# Patient Record
Sex: MALE | Race: WHITE | Employment: OTHER | URBAN - METROPOLITAN AREA
[De-identification: names, ages, dates, MRNs, and addresses within clinical notes are randomized per-mention and may not be internally consistent; named-entity substitution may affect disease eponyms.]

---

## 2017-03-01 ENCOUNTER — APPOINTMENT (OUTPATIENT)
Dept: LAB | Age: 62
End: 2017-03-01
Attending: DERMATOLOGY
Payer: COMMERCIAL

## 2017-03-01 DIAGNOSIS — C44.320 SCC (SQUAMOUS CELL CARCINOMA), FACE: ICD-10-CM

## 2017-03-01 PROCEDURE — 88305 TISSUE EXAM BY PATHOLOGIST: CPT

## 2020-01-20 PROBLEM — Z12.11 SPECIAL SCREENING FOR MALIGNANT NEOPLASM OF COLON: Status: ACTIVE | Noted: 2020-01-20

## 2020-01-20 PROBLEM — K63.5 POLYP OF COLON: Status: ACTIVE | Noted: 2020-01-20

## 2020-01-21 PROCEDURE — 88305 TISSUE EXAM BY PATHOLOGIST: CPT | Performed by: INTERNAL MEDICINE

## 2020-07-02 ENCOUNTER — LAB REQUISITION (OUTPATIENT)
Dept: LAB | Facility: HOSPITAL | Age: 65
End: 2020-07-02
Payer: COMMERCIAL

## 2020-07-02 DIAGNOSIS — D49.2 NEOPLASM OF UNSPECIFIED BEHAVIOR OF BONE, SOFT TISSUE, AND SKIN: ICD-10-CM

## 2020-07-02 PROCEDURE — 88305 TISSUE EXAM BY PATHOLOGIST: CPT | Performed by: DERMATOLOGY

## 2020-11-23 ENCOUNTER — MED REC SCAN ONLY (OUTPATIENT)
Dept: NEPHROLOGY | Facility: CLINIC | Age: 65
End: 2020-11-23

## 2020-12-02 ENCOUNTER — OFFICE VISIT (OUTPATIENT)
Dept: NEPHROLOGY | Facility: CLINIC | Age: 65
End: 2020-12-02

## 2020-12-02 VITALS — SYSTOLIC BLOOD PRESSURE: 126 MMHG | DIASTOLIC BLOOD PRESSURE: 78 MMHG | BODY MASS INDEX: 22 KG/M2 | WEIGHT: 176 LBS

## 2020-12-02 DIAGNOSIS — N20.0 STAGHORN CALCULUS: ICD-10-CM

## 2020-12-02 DIAGNOSIS — N18.30 STAGE 3 CHRONIC KIDNEY DISEASE, UNSPECIFIED WHETHER STAGE 3A OR 3B CKD (HCC): Primary | ICD-10-CM

## 2020-12-02 PROCEDURE — 3074F SYST BP LT 130 MM HG: CPT | Performed by: INTERNAL MEDICINE

## 2020-12-02 PROCEDURE — 99244 OFF/OP CNSLTJ NEW/EST MOD 40: CPT | Performed by: INTERNAL MEDICINE

## 2020-12-02 PROCEDURE — 3078F DIAST BP <80 MM HG: CPT | Performed by: INTERNAL MEDICINE

## 2020-12-02 NOTE — PROGRESS NOTES
Nephrology Consult Note    REASON FOR CONSULT: CKD 3    ASSESSMENT/PLAN:        1) CKD 3- serum creatinine has increased gradually from 1.0 to 1.49 mg/dL since 2016 and likely reflects loss of left-sided renal function given history of a large staghorn tavon prescription medications. Does not take NSAIDs.     ROS:    Denies fever/chills  Denies wt loss/gain  Denies HA or visual changes  Denies CP or palpitations  Denies SOB/cough/hemoptysis  Denies abd or flank pain  Denies N/V/D  Denies change in urinary habi Regular rate and rhythm, S1, S2 normal, no murmur or rub  Lungs: Clear without wheezes, rales, rhonchi. Abdomen: Soft, non-tender. + bowel sounds, no palpable organomegaly  Extremities: Without clubbing, cyanosis or edema.   Neurologic:  normal affect, c

## 2020-12-03 ENCOUNTER — HOSPITAL ENCOUNTER (OUTPATIENT)
Dept: ULTRASOUND IMAGING | Facility: HOSPITAL | Age: 65
Discharge: HOME | End: 2020-12-03
Attending: INTERNAL MEDICINE
Payer: COMMERCIAL

## 2020-12-03 ENCOUNTER — LAB ENCOUNTER (OUTPATIENT)
Dept: LAB | Facility: HOSPITAL | Age: 65
End: 2020-12-03
Attending: INTERNAL MEDICINE
Payer: COMMERCIAL

## 2020-12-03 DIAGNOSIS — N20.0 STAGHORN CALCULUS: ICD-10-CM

## 2020-12-03 DIAGNOSIS — N18.30 STAGE 3 CHRONIC KIDNEY DISEASE, UNSPECIFIED WHETHER STAGE 3A OR 3B CKD (HCC): ICD-10-CM

## 2020-12-03 PROCEDURE — 87077 CULTURE AEROBIC IDENTIFY: CPT

## 2020-12-03 PROCEDURE — 81001 URINALYSIS AUTO W/SCOPE: CPT

## 2020-12-03 PROCEDURE — 76770 US EXAM ABDO BACK WALL COMP: CPT | Performed by: INTERNAL MEDICINE

## 2020-12-03 PROCEDURE — 87086 URINE CULTURE/COLONY COUNT: CPT

## 2020-12-08 ENCOUNTER — TELEPHONE (OUTPATIENT)
Dept: NEPHROLOGY | Facility: CLINIC | Age: 65
End: 2020-12-08

## 2020-12-09 NOTE — TELEPHONE ENCOUNTER
D/W pt- CKD 3 Cr 1.5ish due to atrophic / nonfunctioning L kidney; has a large > 1.5 cm calcification of L kidney + hydronephrosis by US- asked pt to f/u with  (Dr. Pascale Tolliver)- labs visit 2014- thx Chelsey Foster

## 2020-12-19 ENCOUNTER — HOSPITAL ENCOUNTER (OUTPATIENT)
Dept: GENERAL RADIOLOGY | Age: 65
End: 2020-12-19
Attending: PHYSICIAN ASSISTANT
Payer: COMMERCIAL

## 2020-12-19 ENCOUNTER — HOSPITAL ENCOUNTER (OUTPATIENT)
Dept: GENERAL RADIOLOGY | Facility: HOSPITAL | Age: 65
Discharge: HOME | End: 2020-12-19
Attending: PHYSICIAN ASSISTANT
Payer: COMMERCIAL

## 2020-12-19 DIAGNOSIS — M54.50 LOW BACK PAIN: ICD-10-CM

## 2020-12-19 DIAGNOSIS — M54.32 SCIATICA OF LEFT SIDE: ICD-10-CM

## 2020-12-19 PROCEDURE — 72110 X-RAY EXAM L-2 SPINE 4/>VWS: CPT | Performed by: PHYSICIAN ASSISTANT

## 2020-12-23 ENCOUNTER — HOSPITAL ENCOUNTER (OUTPATIENT)
Dept: CT IMAGING | Facility: HOSPITAL | Age: 65
Discharge: HOME | End: 2020-12-23
Attending: PHYSICIAN ASSISTANT
Payer: COMMERCIAL

## 2020-12-23 ENCOUNTER — APPOINTMENT (OUTPATIENT)
Dept: GENERAL RADIOLOGY | Age: 65
End: 2020-12-23
Attending: PHYSICIAN ASSISTANT
Payer: COMMERCIAL

## 2020-12-23 ENCOUNTER — HOSPITAL ENCOUNTER (OUTPATIENT)
Dept: GENERAL RADIOLOGY | Facility: HOSPITAL | Age: 65
Discharge: HOME | End: 2020-12-23
Attending: PHYSICIAN ASSISTANT
Payer: COMMERCIAL

## 2020-12-23 DIAGNOSIS — N13.2 HYDRONEPHROSIS WITH OBSTRUCTING CALCULUS: ICD-10-CM

## 2020-12-23 DIAGNOSIS — N20.0 RENAL STONE: ICD-10-CM

## 2020-12-23 PROCEDURE — 74018 RADEX ABDOMEN 1 VIEW: CPT | Performed by: PHYSICIAN ASSISTANT

## 2020-12-23 PROCEDURE — 74176 CT ABD & PELVIS W/O CONTRAST: CPT | Performed by: UROLOGY

## 2021-01-20 NOTE — H&P
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 He presents as a consult from Dr Joe Barkley office with a left staghorn calculus.     Nephro - Estephanie Stout PCNL with antegrade stent placement. We discussed the risks and benefits to the procedure including, but not limited to, bleeding, infection, possible damage to surrounding structures.  We discussed the possible need for second procedure such as URS if we a above       ASSESSMENT/PLAN:   Diagnoses and all orders for this visit:    Staghorn calculus  -     URINALYSIS, AUTO, W/O SCOPE  -     URINE CULTURE, ROUTINE    Hydronephrosis, left  -     URINALYSIS, AUTO, W/O SCOPE  -     URINE CULTURE, ROUTINE    Urine

## 2021-01-20 NOTE — H&P (VIEW-ONLY)
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 He presents as a consult from Dr Renata Esparzar office with a left staghorn calculus.     Niurka - Andre Nguyễn PCNL with antegrade stent placement. We discussed the risks and benefits to the procedure including, but not limited to, bleeding, infection, possible damage to surrounding structures.  We discussed the possible need for second procedure such as URS if we a above       ASSESSMENT/PLAN:   Diagnoses and all orders for this visit:    Staghorn calculus  -     URINALYSIS, AUTO, W/O SCOPE  -     URINE CULTURE, ROUTINE    Hydronephrosis, left  -     URINALYSIS, AUTO, W/O SCOPE  -     URINE CULTURE, ROUTINE    Urine

## 2021-01-26 ENCOUNTER — TELEPHONE (OUTPATIENT)
Dept: SURGERY | Facility: CLINIC | Age: 66
End: 2021-01-26

## 2021-01-26 ENCOUNTER — OFFICE VISIT (OUTPATIENT)
Dept: SURGERY | Facility: CLINIC | Age: 66
End: 2021-01-26

## 2021-01-26 VITALS — HEART RATE: 76 BPM | DIASTOLIC BLOOD PRESSURE: 77 MMHG | TEMPERATURE: 97 F | SYSTOLIC BLOOD PRESSURE: 145 MMHG

## 2021-01-26 DIAGNOSIS — N20.0 STAGHORN CALCULUS: Primary | ICD-10-CM

## 2021-01-26 DIAGNOSIS — N13.30 HYDRONEPHROSIS, LEFT: ICD-10-CM

## 2021-01-26 DIAGNOSIS — R82.90 URINE FINDING: ICD-10-CM

## 2021-01-26 LAB
MULTISTIX LOT#: 5077 NUMERIC
NITRITE, URINE: POSITIVE
PH, URINE: 6.5 (ref 4.5–8)
SPECIFIC GRAVITY: 1.02 (ref 1–1.03)
URINE-COLOR: YELLOW
UROBILINOGEN,SEMI-QN: 0.2 MG/DL (ref 0–1.9)

## 2021-01-26 PROCEDURE — 99204 OFFICE O/P NEW MOD 45 MIN: CPT | Performed by: UROLOGY

## 2021-01-26 PROCEDURE — 3077F SYST BP >= 140 MM HG: CPT | Performed by: UROLOGY

## 2021-01-26 PROCEDURE — 3078F DIAST BP <80 MM HG: CPT | Performed by: UROLOGY

## 2021-01-26 PROCEDURE — 81003 URINALYSIS AUTO W/O SCOPE: CPT | Performed by: UROLOGY

## 2021-01-26 NOTE — TELEPHONE ENCOUNTER
Good Afternoon    Please provide referring provider for Jenita Going see referral [id number]        Thanks    [address] [facility]

## 2021-01-26 NOTE — TELEPHONE ENCOUNTER
Called patient this date to schedule surgery. Patient did not answer and voicemail box is full and could not leave a message. Will try again later.

## 2021-01-26 NOTE — TELEPHONE ENCOUNTER
Felipe Castillo,  Could you please schedule this patient for surgery? Needs IR nephroureteral access first thing in the morning and procedure ~ 11 am that day. Needs medical clearance prior. Overnight stay. Thank you!     [facility]    Urology Surgery Request  Surg

## 2021-01-26 NOTE — TELEPHONE ENCOUNTER
Patient called back this date to schedule procedure. Patient was scheduled for 2/04/21 at [address] [place] [address] for IR to place nephroureteral access prior to procedure was placed. Reviewed pre-op. Instructions with patient who voiced understanding.   H

## 2021-01-27 ENCOUNTER — TELEPHONE (OUTPATIENT)
Dept: SURGERY | Facility: CLINIC | Age: 66
End: 2021-01-27

## 2021-01-27 RX ORDER — ACETAMINOPHEN 500 MG
1000 TABLET ORAL ONCE
Status: CANCELLED | OUTPATIENT
Start: 2021-01-27 | End: 2021-01-27

## 2021-01-27 RX ORDER — CEFAZOLIN SODIUM/WATER 2 G/20 ML
2 SYRINGE (ML) INTRAVENOUS ONCE
Status: CANCELLED | OUTPATIENT
Start: 2021-01-27 | End: 2021-01-27

## 2021-01-27 RX ORDER — ERGOCALCIFEROL (VITAMIN D2) 10 MCG
1 TABLET ORAL DAILY
COMMUNITY

## 2021-01-27 NOTE — PAT NURSING NOTE
Raleigh Tafoya back and said he clarified and informed the surgeons office of need for them to schedule the placement of the neph tube in our radiology dept.  He said he needed to give message to the office person who would need to pass the message on to t

## 2021-01-27 NOTE — PAT NURSING NOTE
I called the office to ask several questions, but was only able to have a message sent to Rashel. Await her call back at this time.

## 2021-01-27 NOTE — PAT NURSING NOTE
II called surgery scheduling and talked to Shirley Dutton him that there is no appt in radiology for Neph tube placement pre-op. He said he would call the surgeon office, and that they are the one's who need to make the appt with Radiology.

## 2021-01-27 NOTE — PAT NURSING NOTE
I spoke to patient and he said he has reached out to His PCP office Dr Komal Mercer, in order to make an appt pre-op with him. He said the pcp is waiting for the surgeon office to \"resend \" paperwork which needed some corrections on it. And once PCP receives th

## 2021-01-27 NOTE — PAT NURSING NOTE
Arvin Lai has not retirned my call, as questions regarding her \"scheduling\" the neph tube procedure in IR. It currently has not been scheduled.

## 2021-01-27 NOTE — PAT NURSING NOTE
I left Sharmin Simon a detailed message, about Neph tube scheduling, and also questions about pt's not taking or ?taking gatorade and tylenol pre procedure. Versus NPO after 11pm as ordered on the scheduling request sheet.  Need clarification of these above

## 2021-01-27 NOTE — TELEPHONE ENCOUNTER
Per Ramy Quintana with [facility] needs to speak to [facility] regarding upcoming surgery. Per Ramy Quintana it is important.  Please advise

## 2021-01-27 NOTE — PAT NURSING NOTE
Pt wife called and said pt will see Dr Kennard Cushing and have pre testing done on 2-1-21. I fax'd to Dr Kennard Cushing the necessary pre testing  . And called the office and left them a message that this fax was sent.

## 2021-01-28 NOTE — TELEPHONE ENCOUNTER
Called Pierre at [facility] this date. Left message for her to call me back.

## 2021-01-28 NOTE — TELEPHONE ENCOUNTER
Called Geeta at IR this date and let her know order placed for tube placement prior to surgery on 2/4/21 at 11:45 a.m.

## 2021-02-02 ENCOUNTER — LAB ENCOUNTER (OUTPATIENT)
Dept: LAB | Facility: HOSPITAL | Age: 66
End: 2021-02-02
Attending: UROLOGY
Payer: COMMERCIAL

## 2021-02-02 DIAGNOSIS — Z01.818 PRE-OP TESTING: ICD-10-CM

## 2021-02-02 LAB — SARS-COV-2 RNA RESP QL NAA+PROBE: NOT DETECTED

## 2021-02-04 ENCOUNTER — ANESTHESIA (OUTPATIENT)
Dept: SURGERY | Facility: HOSPITAL | Age: 66
End: 2021-02-04
Payer: COMMERCIAL

## 2021-02-04 ENCOUNTER — APPOINTMENT (OUTPATIENT)
Dept: GENERAL RADIOLOGY | Facility: HOSPITAL | Age: 66
End: 2021-02-04
Attending: UROLOGY
Payer: COMMERCIAL

## 2021-02-04 ENCOUNTER — HOSPITAL ENCOUNTER (OUTPATIENT)
Facility: HOSPITAL | Age: 66
Discharge: HOME | End: 2021-02-05
Attending: UROLOGY | Admitting: UROLOGY
Payer: COMMERCIAL

## 2021-02-04 ENCOUNTER — ANESTHESIA EVENT (OUTPATIENT)
Dept: SURGERY | Facility: HOSPITAL | Age: 66
End: 2021-02-04
Payer: COMMERCIAL

## 2021-02-04 ENCOUNTER — HOSPITAL ENCOUNTER (OUTPATIENT)
Dept: INTERVENTIONAL RADIOLOGY/VASCULAR | Facility: HOSPITAL | Age: 66
Discharge: HOME | End: 2021-02-04
Attending: UROLOGY | Admitting: UROLOGY
Payer: COMMERCIAL

## 2021-02-04 DIAGNOSIS — N20.0 STAGHORN KIDNEY STONES: ICD-10-CM

## 2021-02-04 DIAGNOSIS — N20.0 STAGHORN CALCULUS: ICD-10-CM

## 2021-02-04 DIAGNOSIS — Z01.818 PRE-OP TESTING: Primary | ICD-10-CM

## 2021-02-04 PROCEDURE — 0TC14ZZ EXTIRPATION OF MATTER FROM LEFT KIDNEY, PERCUTANEOUS ENDOSCOPIC APPROACH: ICD-10-PCS | Performed by: UROLOGY

## 2021-02-04 PROCEDURE — 76000 FLUOROSCOPY <1 HR PHYS/QHP: CPT | Performed by: UROLOGY

## 2021-02-04 PROCEDURE — 82365 CALCULUS SPECTROSCOPY: CPT | Performed by: UROLOGY

## 2021-02-04 RX ORDER — ONDANSETRON 2 MG/ML
4 INJECTION INTRAMUSCULAR; INTRAVENOUS EVERY 6 HOURS PRN
Status: DISCONTINUED | OUTPATIENT
Start: 2021-02-04 | End: 2021-02-05

## 2021-02-04 RX ORDER — MORPHINE SULFATE 4 MG/ML
4 INJECTION, SOLUTION INTRAMUSCULAR; INTRAVENOUS EVERY 2 HOUR PRN
Status: ACTIVE | OUTPATIENT
Start: 2021-02-04 | End: 2021-02-05

## 2021-02-04 RX ORDER — MORPHINE SULFATE 2 MG/ML
2 INJECTION, SOLUTION INTRAMUSCULAR; INTRAVENOUS EVERY 2 HOUR PRN
Status: ACTIVE | OUTPATIENT
Start: 2021-02-04 | End: 2021-02-05

## 2021-02-04 RX ORDER — FAMOTIDINE 20 MG/1
20 TABLET ORAL 2 TIMES DAILY PRN
Status: DISCONTINUED | OUTPATIENT
Start: 2021-02-04 | End: 2021-02-05

## 2021-02-04 RX ORDER — CEFAZOLIN SODIUM/WATER 2 G/20 ML
2 SYRINGE (ML) INTRAVENOUS EVERY 8 HOURS
Status: COMPLETED | OUTPATIENT
Start: 2021-02-04 | End: 2021-02-05

## 2021-02-04 RX ORDER — SODIUM CHLORIDE, SODIUM LACTATE, POTASSIUM CHLORIDE, CALCIUM CHLORIDE 600; 310; 30; 20 MG/100ML; MG/100ML; MG/100ML; MG/100ML
INJECTION, SOLUTION INTRAVENOUS CONTINUOUS
Status: DISCONTINUED | OUTPATIENT
Start: 2021-02-04 | End: 2021-02-04 | Stop reason: HOSPADM

## 2021-02-04 RX ORDER — LEVOFLOXACIN 5 MG/ML
500 INJECTION, SOLUTION INTRAVENOUS
Status: COMPLETED | OUTPATIENT
Start: 2021-02-04 | End: 2021-02-04

## 2021-02-04 RX ORDER — MIDAZOLAM HYDROCHLORIDE 1 MG/ML
INJECTION INTRAMUSCULAR; INTRAVENOUS
Status: COMPLETED
Start: 2021-02-04 | End: 2021-02-04

## 2021-02-04 RX ORDER — HYDROCODONE BITARTRATE AND ACETAMINOPHEN 5; 325 MG/1; MG/1
1 TABLET ORAL EVERY 4 HOURS PRN
Status: DISCONTINUED | OUTPATIENT
Start: 2021-02-04 | End: 2021-02-05

## 2021-02-04 RX ORDER — DIPHENHYDRAMINE HYDROCHLORIDE 50 MG/ML
25 INJECTION INTRAMUSCULAR; INTRAVENOUS NIGHTLY PRN
Status: DISCONTINUED | OUTPATIENT
Start: 2021-02-04 | End: 2021-02-05

## 2021-02-04 RX ORDER — SODIUM CHLORIDE 0.9 % (FLUSH) 0.9 %
10 SYRINGE (ML) INJECTION AS NEEDED
Status: DISCONTINUED | OUTPATIENT
Start: 2021-02-04 | End: 2021-02-05

## 2021-02-04 RX ORDER — DOCUSATE SODIUM 100 MG/1
100 CAPSULE, LIQUID FILLED ORAL 2 TIMES DAILY
Status: DISCONTINUED | OUTPATIENT
Start: 2021-02-04 | End: 2021-02-05

## 2021-02-04 RX ORDER — BUPIVACAINE HYDROCHLORIDE 5 MG/ML
INJECTION, SOLUTION EPIDURAL; INTRACAUDAL AS NEEDED
Status: DISCONTINUED | OUTPATIENT
Start: 2021-02-04 | End: 2021-02-04

## 2021-02-04 RX ORDER — HYDROCODONE BITARTRATE AND ACETAMINOPHEN 5; 325 MG/1; MG/1
2 TABLET ORAL EVERY 4 HOURS PRN
Status: DISCONTINUED | OUTPATIENT
Start: 2021-02-04 | End: 2021-02-05

## 2021-02-04 RX ORDER — ENOXAPARIN SODIUM 100 MG/ML
40 INJECTION SUBCUTANEOUS NIGHTLY
Status: DISCONTINUED | OUTPATIENT
Start: 2021-02-04 | End: 2021-02-05

## 2021-02-04 RX ORDER — ONDANSETRON 2 MG/ML
4 INJECTION INTRAMUSCULAR; INTRAVENOUS AS NEEDED
Status: DISCONTINUED | OUTPATIENT
Start: 2021-02-04 | End: 2021-02-04 | Stop reason: HOSPADM

## 2021-02-04 RX ORDER — ACETAMINOPHEN 500 MG
1000 TABLET ORAL ONCE AS NEEDED
Status: DISCONTINUED | OUTPATIENT
Start: 2021-02-04 | End: 2021-02-04 | Stop reason: HOSPADM

## 2021-02-04 RX ORDER — ONDANSETRON 4 MG/1
4 TABLET, FILM COATED ORAL EVERY 6 HOURS PRN
Status: DISCONTINUED | OUTPATIENT
Start: 2021-02-04 | End: 2021-02-05

## 2021-02-04 RX ORDER — METOCLOPRAMIDE HYDROCHLORIDE 5 MG/ML
10 INJECTION INTRAMUSCULAR; INTRAVENOUS AS NEEDED
Status: DISCONTINUED | OUTPATIENT
Start: 2021-02-04 | End: 2021-02-04 | Stop reason: HOSPADM

## 2021-02-04 RX ORDER — DEXAMETHASONE SODIUM PHOSPHATE 4 MG/ML
VIAL (ML) INJECTION AS NEEDED
Status: DISCONTINUED | OUTPATIENT
Start: 2021-02-04 | End: 2021-02-04 | Stop reason: SURG

## 2021-02-04 RX ORDER — ONDANSETRON 2 MG/ML
INJECTION INTRAMUSCULAR; INTRAVENOUS AS NEEDED
Status: DISCONTINUED | OUTPATIENT
Start: 2021-02-04 | End: 2021-02-04 | Stop reason: SURG

## 2021-02-04 RX ORDER — NEOSTIGMINE METHYLSULFATE 1 MG/ML
INJECTION INTRAVENOUS AS NEEDED
Status: DISCONTINUED | OUTPATIENT
Start: 2021-02-04 | End: 2021-02-04 | Stop reason: SURG

## 2021-02-04 RX ORDER — PHENYLEPHRINE HCL 10 MG/ML
VIAL (ML) INJECTION AS NEEDED
Status: DISCONTINUED | OUTPATIENT
Start: 2021-02-04 | End: 2021-02-04 | Stop reason: SURG

## 2021-02-04 RX ORDER — LEVOFLOXACIN 5 MG/ML
INJECTION, SOLUTION INTRAVENOUS
Status: COMPLETED
Start: 2021-02-04 | End: 2021-02-04

## 2021-02-04 RX ORDER — HYDROCODONE BITARTRATE AND ACETAMINOPHEN 5; 325 MG/1; MG/1
2 TABLET ORAL AS NEEDED
Status: DISCONTINUED | OUTPATIENT
Start: 2021-02-04 | End: 2021-02-04 | Stop reason: HOSPADM

## 2021-02-04 RX ORDER — ACETAMINOPHEN 325 MG/1
650 TABLET ORAL EVERY 4 HOURS PRN
Status: DISCONTINUED | OUTPATIENT
Start: 2021-02-04 | End: 2021-02-05

## 2021-02-04 RX ORDER — LEVOFLOXACIN 5 MG/ML
INJECTION, SOLUTION INTRAVENOUS
Status: DISPENSED
Start: 2021-02-04 | End: 2021-02-04

## 2021-02-04 RX ORDER — LIDOCAINE HYDROCHLORIDE 10 MG/ML
INJECTION, SOLUTION EPIDURAL; INFILTRATION; INTRACAUDAL; PERINEURAL AS NEEDED
Status: DISCONTINUED | OUTPATIENT
Start: 2021-02-04 | End: 2021-02-04 | Stop reason: SURG

## 2021-02-04 RX ORDER — HYDROMORPHONE HYDROCHLORIDE 1 MG/ML
0.4 INJECTION, SOLUTION INTRAMUSCULAR; INTRAVENOUS; SUBCUTANEOUS EVERY 5 MIN PRN
Status: DISCONTINUED | OUTPATIENT
Start: 2021-02-04 | End: 2021-02-04 | Stop reason: HOSPADM

## 2021-02-04 RX ORDER — GLYCOPYRROLATE 0.2 MG/ML
INJECTION, SOLUTION INTRAMUSCULAR; INTRAVENOUS AS NEEDED
Status: DISCONTINUED | OUTPATIENT
Start: 2021-02-04 | End: 2021-02-04 | Stop reason: SURG

## 2021-02-04 RX ORDER — ROCURONIUM BROMIDE 10 MG/ML
INJECTION, SOLUTION INTRAVENOUS AS NEEDED
Status: DISCONTINUED | OUTPATIENT
Start: 2021-02-04 | End: 2021-02-04 | Stop reason: SURG

## 2021-02-04 RX ORDER — DEXTROSE, SODIUM CHLORIDE, AND POTASSIUM CHLORIDE 5; .45; .15 G/100ML; G/100ML; G/100ML
INJECTION INTRAVENOUS CONTINUOUS
Status: DISCONTINUED | OUTPATIENT
Start: 2021-02-04 | End: 2021-02-05

## 2021-02-04 RX ORDER — SODIUM CHLORIDE 9 MG/ML
INJECTION, SOLUTION INTRAVENOUS CONTINUOUS
Status: CANCELLED | OUTPATIENT
Start: 2021-02-04

## 2021-02-04 RX ORDER — HYDROCODONE BITARTRATE AND ACETAMINOPHEN 5; 325 MG/1; MG/1
1 TABLET ORAL AS NEEDED
Status: DISCONTINUED | OUTPATIENT
Start: 2021-02-04 | End: 2021-02-04 | Stop reason: HOSPADM

## 2021-02-04 RX ORDER — LIDOCAINE HYDROCHLORIDE 10 MG/ML
INJECTION, SOLUTION INFILTRATION; PERINEURAL
Status: COMPLETED
Start: 2021-02-04 | End: 2021-02-04

## 2021-02-04 RX ADMIN — LEVOFLOXACIN 500 MG: 5 INJECTION, SOLUTION INTRAVENOUS at 12:20:00

## 2021-02-04 RX ADMIN — SODIUM CHLORIDE, SODIUM LACTATE, POTASSIUM CHLORIDE, CALCIUM CHLORIDE: 600; 310; 30; 20 INJECTION, SOLUTION INTRAVENOUS at 13:19:00

## 2021-02-04 RX ADMIN — LIDOCAINE HYDROCHLORIDE 25 MG: 10 INJECTION, SOLUTION EPIDURAL; INFILTRATION; INTRACAUDAL; PERINEURAL at 12:12:00

## 2021-02-04 RX ADMIN — SODIUM CHLORIDE, SODIUM LACTATE, POTASSIUM CHLORIDE, CALCIUM CHLORIDE: 600; 310; 30; 20 INJECTION, SOLUTION INTRAVENOUS at 13:47:00

## 2021-02-04 RX ADMIN — PHENYLEPHRINE HCL 200 MCG: 10 MG/ML VIAL (ML) INJECTION at 13:06:00

## 2021-02-04 RX ADMIN — ONDANSETRON 4 MG: 2 INJECTION INTRAMUSCULAR; INTRAVENOUS at 13:45:00

## 2021-02-04 RX ADMIN — GLYCOPYRROLATE 0.4 MG: 0.2 INJECTION, SOLUTION INTRAMUSCULAR; INTRAVENOUS at 13:55:00

## 2021-02-04 RX ADMIN — PHENYLEPHRINE HCL 200 MCG: 10 MG/ML VIAL (ML) INJECTION at 12:39:00

## 2021-02-04 RX ADMIN — PHENYLEPHRINE HCL 200 MCG: 10 MG/ML VIAL (ML) INJECTION at 13:19:00

## 2021-02-04 RX ADMIN — SODIUM CHLORIDE, SODIUM LACTATE, POTASSIUM CHLORIDE, CALCIUM CHLORIDE: 600; 310; 30; 20 INJECTION, SOLUTION INTRAVENOUS at 12:33:00

## 2021-02-04 RX ADMIN — NEOSTIGMINE METHYLSULFATE 2 MG: 1 INJECTION INTRAVENOUS at 13:55:00

## 2021-02-04 RX ADMIN — ROCURONIUM BROMIDE 35 MG: 10 INJECTION, SOLUTION INTRAVENOUS at 12:12:00

## 2021-02-04 RX ADMIN — DEXAMETHASONE SODIUM PHOSPHATE 4 MG: 4 MG/ML VIAL (ML) INJECTION at 13:22:00

## 2021-02-04 RX ADMIN — PHENYLEPHRINE HCL 200 MCG: 10 MG/ML VIAL (ML) INJECTION at 13:37:00

## 2021-02-04 NOTE — H&P
[address] Patient Status:  Outpatient in a Bed    1955 MRN [id number]   Location [address] [facility] Attending Gino Chavis MD   [facility] Day # 0 DAWSON Ann, [facility]

## 2021-02-04 NOTE — ANESTHESIA PROCEDURE NOTES
Airway  Date/Time: 2/4/2021 12:14 PM  Urgency: elective    Airway not difficult    General Information and Staff    Patient location during procedure: OR  Anesthesiologist: Ygnacio Leyden, MD  Performed: anesthesiologist     Indications and Patient Condi

## 2021-02-04 NOTE — INTERVAL H&P NOTE
Pre-op Diagnosis: Staghorn calculus [N20.0]    The above referenced H&P was reviewed by Devika Tracey MD on 2/4/2021, the patient was examined and no significant changes have occurred in the patient's condition since the H&P was performed.   I discussed wit

## 2021-02-04 NOTE — PROCEDURES
[address] Patient Status:  Outpatient in a Bed    1955 MRN [id number]   [facility] Att[facility] Susie Ellis MD   Hosp Day # 0 PCP Matteo Conteh MD         Brief Procedure Report    Pre

## 2021-02-04 NOTE — PLAN OF CARE
Problem: Patient/Family Goals  Goal: Patient/Family Long Term Goal  Description: Patient's Long Term Goal: discharge    Interventions:  - Advance diet  - tolerate pain  - Return to  function  - See additional Care Plan goals for specific interventions FALL  Goal: Free from fall injury  Description: INTERVENTIONS:  - Assess pt frequently for physical needs  - Identify cognitive and physical deficits and behaviors that affect risk of falls.   - [place] fall precautions as indicated by assessment.  - Educ protocol/standard of care  - Consider collaborating with pharmacy to review patient's medication profile  - Implement strategies to promote bladder emptying  Outcome: Progressing

## 2021-02-04 NOTE — PROGRESS NOTES
NURSING ADMISSION NOTE      Patient admitted via bed. Oriented to room. Safety precautions initiated. Bed in low position. Call light in reach. Pt alert and oriented x4. Marmolejo draining, red/pink.  Nephrostomy tube draining red in standard marmolejo b

## 2021-02-04 NOTE — ANESTHESIA POSTPROCEDURE EVALUATION
[address] Patient Status:  Outpatient in a Bed   Age/Gender 77year old adult MRN [id number]   [facility] SURGERY Attending Ezequiel Golden MD   [facility] Day # 0 PCP Franciscojoyce Vidal MD       Anesthesia Post-op Note    Pro

## 2021-02-04 NOTE — OPERATIVE REPORT
Urology Operative Note    Attending Surgeon: Marques Mejia    Patient Name: Gray Simpson    Date of Surgery: 2/4/2021    Preoperative Diagnosis: left sided kidney stones    Postoperative Diagnosis: same    Procedure Performed: left percutaneous nephro fragmented using the Lithoclast device and the pieces were extracted without difficulty. I then performed antegrade flexible ureteroscopy. To do this I inserted the flexible sheath over the safety wire down to the upper ureter.  I then inserted the flexibl

## 2021-02-04 NOTE — ANESTHESIA PREPROCEDURE EVALUATION
PRE-OP EVALUATION    Patient Name: Ghislaine Field    Pre-op Diagnosis: Staghorn calculus [N20.0]    Procedure(s):  Left percutaneous Nephrolithotomy with interventional radiology nephroureteral access first.    Surgeon(s) and Role:     Rachel Zhou, age.  Negative cardiovascular ROS. Endo/Other    Negative endo/other ROS. Pulmonary  Comment: History DVT & PE (2003), IVC filter placement  Negative pulmonary ROS.

## 2021-02-04 NOTE — PROGRESS NOTES
Pt tolerated clear liquid diet; will advance to soft diet. Pt denies need for pain medication. Wife at bedside. Call light within reach. Will continue to monitor.

## 2021-02-04 NOTE — PROCEDURES
[facility]  Pre-Procedure Note    Name: Shaheen Robbins  MRN#: [id number]  : 1955    Procedure: Left nephroureterostomy cath/guide wire placement. Indication: Staghorn calculi left kidney.     Allergies:    No Known Allergies    Pertinent

## 2021-02-05 ENCOUNTER — APPOINTMENT (OUTPATIENT)
Dept: CT IMAGING | Facility: HOSPITAL | Age: 66
End: 2021-02-05
Attending: UROLOGY
Payer: COMMERCIAL

## 2021-02-05 ENCOUNTER — TELEPHONE (OUTPATIENT)
Dept: SURGERY | Facility: CLINIC | Age: 66
End: 2021-02-05

## 2021-02-05 VITALS
RESPIRATION RATE: 18 BRPM | HEART RATE: 115 BPM | BODY MASS INDEX: 22.93 KG/M2 | SYSTOLIC BLOOD PRESSURE: 135 MMHG | HEIGHT: 72 IN | WEIGHT: 169.31 LBS | TEMPERATURE: 99 F | DIASTOLIC BLOOD PRESSURE: 86 MMHG | OXYGEN SATURATION: 100 %

## 2021-02-05 LAB
ANION GAP SERPL CALC-SCNC: 5 MMOL/L (ref 0–18)
BUN BLD-MCNC: 17 MG/DL (ref 7–18)
BUN/CREAT SERPL: 8.8 (ref 10–20)
CALCIUM BLD-MCNC: 9.6 MG/DL (ref 8.5–10.1)
CHLORIDE SERPL-SCNC: 106 MMOL/L (ref 98–112)
CO2 SERPL-SCNC: 28 MMOL/L (ref 21–32)
CREAT BLD-MCNC: 1.94 MG/DL
DEPRECATED RDW RBC AUTO: 42.5 FL (ref 35.1–46.3)
ERYTHROCYTE [DISTWIDTH] IN BLOOD BY AUTOMATED COUNT: 12.3 % (ref 11–15)
GLUCOSE BLD-MCNC: 110 MG/DL (ref 70–99)
HCT VFR BLD AUTO: 44.5 %
HGB BLD-MCNC: 14.5 G/DL
MCH RBC QN AUTO: 30.5 PG (ref 26–34)
MCHC RBC AUTO-ENTMCNC: 32.6 G/DL (ref 31–37)
MCV RBC AUTO: 93.5 FL
OSMOLALITY SERPL CALC.SUM OF ELEC: 290 MOSM/KG (ref 275–295)
PLATELET # BLD AUTO: 291 10(3)UL (ref 150–450)
POTASSIUM SERPL-SCNC: 4.4 MMOL/L (ref 3.5–5.1)
RBC # BLD AUTO: 4.76 X10(6)UL
SODIUM SERPL-SCNC: 139 MMOL/L (ref 136–145)
WBC # BLD AUTO: 17 X10(3) UL (ref 4–11)

## 2021-02-05 PROCEDURE — 74176 CT ABD & PELVIS W/O CONTRAST: CPT | Performed by: UROLOGY

## 2021-02-05 PROCEDURE — 85027 COMPLETE CBC AUTOMATED: CPT | Performed by: UROLOGY

## 2021-02-05 PROCEDURE — 99152 MOD SED SAME PHYS/QHP 5/>YRS: CPT

## 2021-02-05 PROCEDURE — 50437 DILAT XST TRC NEW ACCESS RCS: CPT

## 2021-02-05 PROCEDURE — 80048 BASIC METABOLIC PNL TOTAL CA: CPT | Performed by: UROLOGY

## 2021-02-05 PROCEDURE — 99153 MOD SED SAME PHYS/QHP EA: CPT

## 2021-02-05 RX ORDER — CIPROFLOXACIN 500 MG/1
500 TABLET, FILM COATED ORAL 2 TIMES DAILY
Qty: 10 TABLET | Refills: 0 | Status: SHIPPED | OUTPATIENT
Start: 2021-02-05 | End: 2021-02-10

## 2021-02-05 RX ORDER — DOCUSATE SODIUM 100 MG/1
100 CAPSULE, LIQUID FILLED ORAL 2 TIMES DAILY PRN
Qty: 30 CAPSULE | Refills: 0 | Status: SHIPPED | OUTPATIENT
Start: 2021-02-05

## 2021-02-05 RX ORDER — HYDROCODONE BITARTRATE AND ACETAMINOPHEN 5; 325 MG/1; MG/1
1 TABLET ORAL EVERY 6 HOURS PRN
Qty: 30 TABLET | Refills: 0 | Status: SHIPPED | OUTPATIENT
Start: 2021-02-05

## 2021-02-05 NOTE — DISCHARGE SUMMARY
[facility]    Urology Discharge Summary    Santana Smiht Patient Status:  Outpatient in a Bed    1955 MRN [id number]   [facility] 3NW-A Attending Susie Ellis MD   Hosp Day # 0 PCP Matteo Conteh MD     Date of Admission: 2 constipation. Ciprofloxacin HCl 500 MG Oral Tab  Take 1 tablet (500 mg total) by mouth 2 (two) times daily for 5 days. Home Meds - Unchanged    Acetaminophen (ACETAMINOPHEN EXTRA STRENGTH) 500 MG Oral Cap  Take 1,000 mg by mouth one time.     Multip them when you go home. You may resume these medications in 4 weeks. - If you are medically allowed to take ibuprofen then you may take 200-400 mg every 8 hours as needed for pain with plenty of fluids.  You may take this in addition to tylenol or other p

## 2021-02-05 NOTE — TELEPHONE ENCOUNTER
RN called patient. Spoke to wife, Magalis Pozo to set up follow up appt. Patient is a S/P nephrolithotomy percutaneous on 2/4. Wife is agreeable to 2/24 Wednesday at 9AM follow up visit with Dr Brandy Ford. Verbalized understanding to plans.

## 2021-02-05 NOTE — PROGRESS NOTES
0118: pt's dressing was saturated and changed 2 times in about 2 hours. surgeon paged to notify. 0120: callback received. Per MD ok to continue to monitor.

## 2021-02-05 NOTE — PLAN OF CARE
NURSING DISCHARGE NOTE    Discharged Home ambulatory. Accompanied by Support staff  Belongings Taken by patient/family. Pt verbalized understanding of discharge instructions. Pt IV removed.  Pt voided after catheter removal. Pt will go to follow up cognitive and physical deficits and behaviors that affect risk of falls.   - [place] fall precautions as indicated by assessment.  - Educate pt/family on patient safety including physical limitations  - Instruct pt to call for assistance with activity bas

## 2021-02-05 NOTE — TELEPHONE ENCOUNTER
Hi,    Could you please call this patient or their family and schedule the patient for a follow up with me in 2-4 weeks for post-op appointment?     Thanks,    MPH

## 2021-02-05 NOTE — PLAN OF CARE
Pt is a&o X4. VSS and afebrile. RA, . SCDs/ Lovenox. Soft diet, denies n/v. Elkins catheter in place. Pt denies pain at this time. Up with standby. Nephrostomy tube to left back covered with split gauze and tape. IV fluids infusing to left forearm.  Pt re Assess pt frequently for physical needs  - Identify cognitive and physical deficits and behaviors that affect risk of falls.   - [place] fall precautions as indicated by assessment.  - Educate pt/family on patient safety including physical limitations  -

## 2021-02-09 NOTE — PROGRESS NOTES
Kaela Banegas,  I hope you are feeling well! I have reviewed your test results. Your stone analysis shows calcium phosphate, oxalate and magnesium. I left recommendations for stone prevention below and we can discuss this again at your upcoming appointment.  mEily

## 2021-02-19 NOTE — PROGRESS NOTES
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 He is s/p left PCNL 2/5/21 (had left PCNL in 2014 with Cathlean Maclachlan as well).  Sto left renal pelvis stone with hydronephrosis.   Saw Dr Princess Alvares in Dec 2020 and I reviewed his CT SP images with him showed 2.3 x 1.5 left renal pelvis stone with several stones noted in LLP (largest measuring up to 1.2 cm, though this may be a cluster of st [facility]) 2003    history of right DVT   • Alvaro filter in place 2003   • PE (pulmonary embolism) 2003    history of right PE   • Presence of other cardiac implants and grafts     patient denies cardiac stent/or graft   • Pulmonary embolism ([address]    • Sta HPI.    PHYSICAL EXAM:     GENERAL APPEARANCE: well, developed, well nourished, in no acute distress  NEUROLOGIC: nonfocal, alert and oriented  HEAD: normocephalic, atraumatic  EYES: sclera non-icteric  EARS: hearing intact  ORAL CAVITY: mucosa moist  NECK

## 2021-02-24 ENCOUNTER — OFFICE VISIT (OUTPATIENT)
Dept: SURGERY | Facility: CLINIC | Age: 66
End: 2021-02-24

## 2021-02-24 VITALS — HEART RATE: 67 BPM | TEMPERATURE: 97 F | DIASTOLIC BLOOD PRESSURE: 77 MMHG | SYSTOLIC BLOOD PRESSURE: 139 MMHG

## 2021-02-24 DIAGNOSIS — N13.30 HYDRONEPHROSIS, LEFT: ICD-10-CM

## 2021-02-24 DIAGNOSIS — N39.0 RECURRENT UTI: ICD-10-CM

## 2021-02-24 DIAGNOSIS — N20.0 STAGHORN CALCULUS: Primary | ICD-10-CM

## 2021-02-24 LAB
APPEARANCE: CLEAR
MULTISTIX LOT#: 5077 NUMERIC
NITRITE, URINE: POSITIVE
PH, URINE: 6.5 (ref 4.5–8)
SPECIFIC GRAVITY: 1.02 (ref 1–1.03)
URINE-COLOR: YELLOW
UROBILINOGEN,SEMI-QN: 0.2 MG/DL (ref 0–1.9)

## 2021-02-24 PROCEDURE — 3075F SYST BP GE 130 - 139MM HG: CPT | Performed by: UROLOGY

## 2021-02-24 PROCEDURE — 81003 URINALYSIS AUTO W/O SCOPE: CPT | Performed by: UROLOGY

## 2021-02-24 PROCEDURE — 99214 OFFICE O/P EST MOD 30 MIN: CPT | Performed by: UROLOGY

## 2021-02-24 PROCEDURE — 3078F DIAST BP <80 MM HG: CPT | Performed by: UROLOGY

## 2021-02-24 PROCEDURE — 1111F DSCHRG MED/CURRENT MED MERGE: CPT | Performed by: UROLOGY

## 2021-02-24 RX ORDER — CIPROFLOXACIN 500 MG/1
500 TABLET, FILM COATED ORAL 2 TIMES DAILY
Qty: 14 TABLET | Refills: 0 | Status: SHIPPED | OUTPATIENT
Start: 2021-02-24 | End: 2021-03-03

## 2021-03-01 ENCOUNTER — TELEPHONE (OUTPATIENT)
Dept: SURGERY | Facility: CLINIC | Age: 66
End: 2021-03-01

## 2021-03-01 NOTE — TELEPHONE ENCOUNTER
RN called patient to relay MD's message and recommendations. Patients agreeable to plans and verbalized understanding. He has a follow up on 4/27 Tue at 1 AM with Dr Archie Duarte. 3/1/2021  1:27 PM  Results reviewed.  Please inform patient his UCx justin [place]

## 2021-03-01 NOTE — PROGRESS NOTES
Results reviewed. Please inform patient his UCx grew Staph which could just be a skin contaminant. This is similar to prior UCx result.  He should go ahead and finish out current antibiotic Rx and as long as he's otherwise feeling OK can f/u with me as sche

## 2021-03-13 DIAGNOSIS — Z23 NEED FOR VACCINATION: ICD-10-CM

## 2021-04-14 ENCOUNTER — LAB ENCOUNTER (OUTPATIENT)
Dept: LAB | Age: 66
End: 2021-04-14
Attending: UROLOGY
Payer: COMMERCIAL

## 2021-04-14 DIAGNOSIS — N20.0 STAGHORN CALCULUS: ICD-10-CM

## 2021-04-15 PROCEDURE — 82436 ASSAY OF URINE CHLORIDE: CPT

## 2021-04-15 PROCEDURE — 83945 ASSAY OF OXALATE: CPT

## 2021-04-15 PROCEDURE — 82507 ASSAY OF CITRATE: CPT

## 2021-04-15 PROCEDURE — 84392 ASSAY OF URINE SULFATE: CPT

## 2021-04-15 PROCEDURE — 82340 ASSAY OF CALCIUM IN URINE: CPT

## 2021-04-20 NOTE — PROGRESS NOTES
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 He is s/p left PCNL 2/5/21 (had left PCNL in 2014 with Renetta Flatness as well).  Sto educational materials for this. I recommend drinking at least 40-60 ounces of water per day or enough water to keep urine clear. I also recommend the patient avoid a high sodium diet. He would also like to check a 24 h urine.  Will RTC in a couple mo with 2 malignancy: none    PSA was 1.8 in Sep 2020, checked annually with Dr Priscilla Hay. We discussed stone prevention strategies at today's visit and I provided and reviewed educational materials for this.  I recommend drinking at least 40-60 ounces of water per d today's visit):  Current Outpatient Medications   Medication Sig Dispense Refill   • Potassium Citrate ER (UROCIT-K 10) 10 MEQ (1080 MG) Oral Tab CR Take 1 tablet (10 mEq total) by mouth daily.  90 tablet 5   • HYDROcodone-acetaminophen (NORCO) 5-325 MG Ora this consult.     Kasandra Herndon MD, [facility]  Urologist  [address]  Office: [phone]

## 2021-04-27 ENCOUNTER — OFFICE VISIT (OUTPATIENT)
Dept: SURGERY | Facility: CLINIC | Age: 66
End: 2021-04-27

## 2021-04-27 VITALS — HEART RATE: 62 BPM | DIASTOLIC BLOOD PRESSURE: 76 MMHG | SYSTOLIC BLOOD PRESSURE: 124 MMHG | TEMPERATURE: 98 F

## 2021-04-27 DIAGNOSIS — N20.0 RECURRENT KIDNEY STONES: ICD-10-CM

## 2021-04-27 DIAGNOSIS — N13.8 BPH WITH OBSTRUCTION/LOWER URINARY TRACT SYMPTOMS: ICD-10-CM

## 2021-04-27 DIAGNOSIS — R82.991 HYPOCITRATURIA: ICD-10-CM

## 2021-04-27 DIAGNOSIS — N13.30 HYDRONEPHROSIS, LEFT: ICD-10-CM

## 2021-04-27 DIAGNOSIS — N20.0 STAGHORN CALCULUS: Primary | ICD-10-CM

## 2021-04-27 DIAGNOSIS — N39.0 RECURRENT UTI: ICD-10-CM

## 2021-04-27 DIAGNOSIS — N40.1 BPH WITH OBSTRUCTION/LOWER URINARY TRACT SYMPTOMS: ICD-10-CM

## 2021-04-27 PROCEDURE — 99024 POSTOP FOLLOW-UP VISIT: CPT | Performed by: UROLOGY

## 2021-04-27 PROCEDURE — 3074F SYST BP LT 130 MM HG: CPT | Performed by: UROLOGY

## 2021-04-27 PROCEDURE — 81003 URINALYSIS AUTO W/O SCOPE: CPT | Performed by: UROLOGY

## 2021-04-27 PROCEDURE — 3078F DIAST BP <80 MM HG: CPT | Performed by: UROLOGY

## 2021-04-27 RX ORDER — POTASSIUM CITRATE 10 MEQ/1
10 TABLET, EXTENDED RELEASE ORAL DAILY
Qty: 90 TABLET | Refills: 5 | Status: SHIPPED | OUTPATIENT
Start: 2021-04-27

## 2021-04-27 NOTE — PATIENT INSTRUCTIONS
1. Increase water intake to at least 40-60 ounces (2 liters) per day or enough to keep urine clear to pale yellow. 2. Cut back on salt  3. Start urocit daily  4. We are checking urine culture and will call you if positive.

## 2021-04-28 ENCOUNTER — PATIENT MESSAGE (OUTPATIENT)
Dept: SURGERY | Facility: CLINIC | Age: 66
End: 2021-04-28

## 2021-04-28 NOTE — TELEPHONE ENCOUNTER
FYI        From: Gaetano Diego  To: Lisa Gardner MD  Sent: 4/28/2021  1:13 PM CDT  Subject: Other    Hi Dr Victor Favre:    I just listened to your voicemail from yesterday.   I may have briefly mentioned to you we had our middle son and girlfriend in from [place]

## 2021-04-28 NOTE — TELEPHONE ENCOUNTER
----- Message from Raeann Finley MD sent at 4/28/2021 12:35 PM CDT -----  Results reviewed. Ucx shows no growth. Please let patient know.     Thank you,  MPH

## 2022-03-07 ENCOUNTER — HOSPITAL ENCOUNTER (OUTPATIENT)
Dept: GENERAL RADIOLOGY | Facility: HOSPITAL | Age: 67
Discharge: HOME | End: 2022-03-07
Attending: UROLOGY
Payer: COMMERCIAL

## 2022-03-07 DIAGNOSIS — N20.0 STAGHORN CALCULUS: ICD-10-CM

## 2022-03-07 PROCEDURE — 74018 RADEX ABDOMEN 1 VIEW: CPT | Performed by: UROLOGY

## 2022-03-07 NOTE — PROGRESS NOTES
Results reviewed. KUB shows possible small left renal stones. Pt was supposed to have appt with me to review. Can do video or in person visit with him if he'd like.     Thanks,  MPH

## 2022-03-11 ENCOUNTER — TELEPHONE (OUTPATIENT)
Dept: SURGERY | Facility: CLINIC | Age: 67
End: 2022-03-11

## 2022-03-11 NOTE — TELEPHONE ENCOUNTER
----- Message from Gui Cristina MD sent at 3/7/2022 12:29 PM CST -----  Results reviewed. KUB shows possible small left renal stones. Pt was supposed to have appt with me to review. Can do video or in person visit with him if he'd like.     Thanks,  MPH

## 2022-03-23 ENCOUNTER — TELEPHONE (OUTPATIENT)
Dept: SURGERY | Facility: CLINIC | Age: 67
End: 2022-03-23

## 2022-03-23 NOTE — TELEPHONE ENCOUNTER
Referral was received from Dr Mirian Hernandez  Phone [phone]  Good for 3 visits valid 03/23/2022-03/23/2023  RUZQW#[id number] N13.2

## 2022-03-30 ENCOUNTER — OFFICE VISIT (OUTPATIENT)
Dept: SURGERY | Facility: CLINIC | Age: 67
End: 2022-03-30
Payer: COMMERCIAL

## 2022-03-30 DIAGNOSIS — N13.8 BPH WITH OBSTRUCTION/LOWER URINARY TRACT SYMPTOMS: ICD-10-CM

## 2022-03-30 DIAGNOSIS — N40.1 BPH WITH OBSTRUCTION/LOWER URINARY TRACT SYMPTOMS: ICD-10-CM

## 2022-03-30 DIAGNOSIS — N39.0 RECURRENT UTI: ICD-10-CM

## 2022-03-30 DIAGNOSIS — N13.30 HYDRONEPHROSIS, LEFT: ICD-10-CM

## 2022-03-30 DIAGNOSIS — N20.0 STAGHORN CALCULUS: Primary | ICD-10-CM

## 2022-03-30 DIAGNOSIS — R82.991 HYPOCITRATURIA: ICD-10-CM

## 2022-03-30 LAB
BILIRUBIN: NEGATIVE
GLUCOSE (URINE DIPSTICK): NEGATIVE MG/DL
KETONES (URINE DIPSTICK): NEGATIVE MG/DL
MULTISTIX LOT#: ABNORMAL NUMERIC
NITRITE, URINE: NEGATIVE
PH, URINE: 5.5 (ref 4.5–8)
SPECIFIC GRAVITY: 1.02 (ref 1–1.03)
URINE-COLOR: YELLOW
UROBILINOGEN,SEMI-QN: 0.2 MG/DL (ref 0–1.9)

## 2022-03-30 PROCEDURE — 99214 OFFICE O/P EST MOD 30 MIN: CPT | Performed by: UROLOGY

## 2022-03-30 PROCEDURE — 81003 URINALYSIS AUTO W/O SCOPE: CPT | Performed by: UROLOGY

## 2022-03-30 RX ORDER — POTASSIUM CITRATE 10 MEQ/1
10 TABLET, EXTENDED RELEASE ORAL DAILY
Qty: 90 TABLET | Refills: 5 | Status: SHIPPED | OUTPATIENT
Start: 2022-03-30

## 2022-04-05 ENCOUNTER — PATIENT MESSAGE (OUTPATIENT)
Dept: SURGERY | Facility: CLINIC | Age: 67
End: 2022-04-05

## 2022-04-05 NOTE — TELEPHONE ENCOUNTER
From: Roseline Escobar  To: Ebonie Montano MD  Sent: 4/5/2022 7:06 AM CDT  Subject: Eleni Velasco my blood test that i mentioned to you got lost but was told last night that creotine result was 1.45 which i believe is same from prior after surgery.  My doctor's office was suppose to fax results over to you, but thought i would drop quick note to let you know results, in case you haven't received  thanks for your help  Shiloh Esposito

## 2022-08-17 ENCOUNTER — HOSPITAL ENCOUNTER (OUTPATIENT)
Dept: CT IMAGING | Facility: HOSPITAL | Age: 67
Discharge: HOME | End: 2022-08-17
Attending: UROLOGY
Payer: COMMERCIAL

## 2022-08-17 DIAGNOSIS — N13.30 HYDRONEPHROSIS, LEFT: ICD-10-CM

## 2022-08-17 PROCEDURE — 74176 CT ABD & PELVIS W/O CONTRAST: CPT | Performed by: UROLOGY

## 2022-08-17 NOTE — PROGRESS NOTES
Results reviewed. Please inform patient CT looks OK. Radiology suggesting UA given bladder wall thickening. I'm OK adding him on Friday for visit - I have a couple openings. If he feels good right now and would prefer to keep appt as scheduled in Oct that's fine too.     Thanks,  MPH

## 2022-08-25 ENCOUNTER — TELEPHONE (OUTPATIENT)
Dept: SURGERY | Facility: CLINIC | Age: 67
End: 2022-08-25

## 2022-08-25 NOTE — TELEPHONE ENCOUNTER
LMTCB to assist on scheduling a follow up appt. For Friday 8/26/22 at 11:00 AM      ----- Message from Audelia Joyner MD sent at 8/17/2022 11:55 AM CDT -----  Results reviewed. Please inform patient CT looks OK. Radiology suggesting UA given bladder wall thickening. I'm OK adding him on Friday for visit - I have a couple openings. If he feels good right now and would prefer to keep appt as scheduled in Oct that's fine too.     Thanks,  MPH

## 2022-08-29 ENCOUNTER — TELEPHONE (OUTPATIENT)
Dept: SURGERY | Facility: CLINIC | Age: 67
End: 2022-08-29

## 2022-08-29 NOTE — TELEPHONE ENCOUNTER
----- Message from Casimiro Husain MD sent at 8/17/2022 11:55 AM CDT -----  Results reviewed. Please inform patient CT looks OK. Radiology suggesting UA given bladder wall thickening. I'm OK adding him on Friday for visit - I have a couple openings. If he feels good right now and would prefer to keep appt as scheduled in Oct that's fine too.     Thanks,  MPH

## 2022-08-29 NOTE — PAT NURSING NOTE
I spoke to Rashel and questions regarding pt instructions regarding Gatorade and Tylenol pre-op answered. I called pt and instructed him of the Instructions regarding taking his gatorade and tylenol pre procedure.  Pt will bring them to hospital if O-Z Plasty Text: The defect edges were debeveled with a #15 scalpel blade.  Given the location of the defect, shape of the defect and the proximity to free margins an O-Z plasty (double transposition flap) was deemed most appropriate.  Using a sterile surgical marker, the appropriate transposition flaps were drawn incorporating the defect and placing the expected incisions within the relaxed skin tension lines where possible.    The area thus outlined was incised deep to adipose tissue with a #15 scalpel blade.  The skin margins were undermined to an appropriate distance in all directions utilizing iris scissors.  Hemostasis was achieved with electrocautery.  The flaps were then transposed into place, one clockwise and the other counterclockwise, and anchored with interrupted buried subcutaneous sutures.

## 2022-10-03 ENCOUNTER — OFFICE VISIT (OUTPATIENT)
Dept: SURGERY | Facility: CLINIC | Age: 67
End: 2022-10-03
Payer: COMMERCIAL

## 2022-10-03 DIAGNOSIS — N13.8 BPH WITH OBSTRUCTION/LOWER URINARY TRACT SYMPTOMS: ICD-10-CM

## 2022-10-03 DIAGNOSIS — N40.1 BPH WITH OBSTRUCTION/LOWER URINARY TRACT SYMPTOMS: ICD-10-CM

## 2022-10-03 DIAGNOSIS — N52.9 ERECTILE DYSFUNCTION, UNSPECIFIED ERECTILE DYSFUNCTION TYPE: ICD-10-CM

## 2022-10-03 DIAGNOSIS — Z00.00 WELLNESS EXAMINATION: ICD-10-CM

## 2022-10-03 DIAGNOSIS — N20.0 STAGHORN CALCULUS: Primary | ICD-10-CM

## 2022-10-03 DIAGNOSIS — R82.991 HYPOCITRATURIA: ICD-10-CM

## 2022-10-03 DIAGNOSIS — N39.0 RECURRENT UTI: ICD-10-CM

## 2022-10-03 LAB
BILIRUBIN: NEGATIVE
GLUCOSE (URINE DIPSTICK): NEGATIVE MG/DL
KETONES (URINE DIPSTICK): NEGATIVE MG/DL
MULTISTIX LOT#: ABNORMAL NUMERIC
NITRITE, URINE: POSITIVE
PH, URINE: 6.5 (ref 4.5–8)
PROTEIN (URINE DIPSTICK): NEGATIVE MG/DL
SPECIFIC GRAVITY: 1.01 (ref 1–1.03)
URINE-COLOR: YELLOW
UROBILINOGEN,SEMI-QN: 0.2 MG/DL (ref 0–1.9)

## 2022-10-03 PROCEDURE — 81003 URINALYSIS AUTO W/O SCOPE: CPT | Performed by: UROLOGY

## 2022-10-03 PROCEDURE — 99214 OFFICE O/P EST MOD 30 MIN: CPT | Performed by: UROLOGY

## 2022-10-03 RX ORDER — TADALAFIL 20 MG/1
20 TABLET ORAL
Qty: 30 TABLET | Refills: 5 | Status: SHIPPED | OUTPATIENT
Start: 2022-10-03

## 2022-10-03 RX ORDER — FINASTERIDE 5 MG/1
5 TABLET, FILM COATED ORAL DAILY
Qty: 90 TABLET | Refills: 6 | Status: SHIPPED | OUTPATIENT
Start: 2022-10-03

## 2022-10-03 RX ORDER — TAMSULOSIN HYDROCHLORIDE 0.4 MG/1
0.4 CAPSULE ORAL EVERY EVENING
Qty: 90 CAPSULE | Refills: 6 | Status: SHIPPED | OUTPATIENT
Start: 2022-10-03

## 2023-01-03 ENCOUNTER — OFFICE VISIT (OUTPATIENT)
Dept: FAMILY MEDICINE CLINIC | Facility: CLINIC | Age: 68
End: 2023-01-03
Payer: MEDICARE

## 2023-01-03 VITALS
DIASTOLIC BLOOD PRESSURE: 70 MMHG | HEART RATE: 65 BPM | BODY MASS INDEX: 23.57 KG/M2 | TEMPERATURE: 99 F | OXYGEN SATURATION: 97 % | WEIGHT: 174 LBS | HEIGHT: 72 IN | RESPIRATION RATE: 16 BRPM | SYSTOLIC BLOOD PRESSURE: 124 MMHG

## 2023-01-03 DIAGNOSIS — Z87.442 HISTORY OF URIC ACID STAGHORN CALCULUS: ICD-10-CM

## 2023-01-03 DIAGNOSIS — Z00.00 ENCOUNTER FOR ANNUAL HEALTH EXAMINATION: Primary | ICD-10-CM

## 2023-01-03 DIAGNOSIS — Z86.711 HISTORY OF PULMONARY EMBOLUS (PE): ICD-10-CM

## 2023-01-03 DIAGNOSIS — N18.31 STAGE 3A CHRONIC KIDNEY DISEASE (HCC): ICD-10-CM

## 2023-01-03 PROBLEM — Z12.11 SPECIAL SCREENING FOR MALIGNANT NEOPLASM OF COLON: Status: RESOLVED | Noted: 2020-01-20 | Resolved: 2023-01-03

## 2023-01-03 PROBLEM — K63.5 POLYP OF COLON: Status: RESOLVED | Noted: 2020-01-20 | Resolved: 2023-01-03

## 2023-01-03 PROCEDURE — 96160 PT-FOCUSED HLTH RISK ASSMT: CPT | Performed by: FAMILY MEDICINE

## 2023-01-03 PROCEDURE — 99397 PER PM REEVAL EST PAT 65+ YR: CPT | Performed by: FAMILY MEDICINE

## 2023-01-03 PROCEDURE — 3008F BODY MASS INDEX DOCD: CPT | Performed by: FAMILY MEDICINE

## 2023-01-03 PROCEDURE — 3078F DIAST BP <80 MM HG: CPT | Performed by: FAMILY MEDICINE

## 2023-01-03 PROCEDURE — 3074F SYST BP LT 130 MM HG: CPT | Performed by: FAMILY MEDICINE

## 2023-01-03 PROCEDURE — G0402 INITIAL PREVENTIVE EXAM: HCPCS | Performed by: FAMILY MEDICINE

## 2023-01-03 RX ORDER — NITROFURANTOIN 25; 75 MG/1; MG/1
CAPSULE ORAL
COMMUNITY
Start: 2022-12-31

## 2023-01-04 ENCOUNTER — TELEPHONE (OUTPATIENT)
Dept: SURGERY | Facility: CLINIC | Age: 68
End: 2023-01-04

## 2023-01-04 NOTE — TELEPHONE ENCOUNTER
----- Message from Gertrude Sera sent at 12/30/2022  9:34 AM CST -----  Regarding: Elevated Creatinine Reading  Hi Dr Ariella Cronin:  I had 2 questions:  1-Recent annual physical showed increase Creatine level at 1.62, I took another a month later and was 1.59---Should this be of any concern or ok to discuss in April when we have a scheduled appt  2-Sridhar Campoverde (Dr Sonia Galvan office) also ordered a urine test which showed findings that might be related to infection--I believe he faxed this stuff to you--he wants me to take \"Macrobid\" for 10 days  Per your thought we have transitioned to Dr Onesimo Luna starting after the 1st of year--I don't know how all this plays out with Medicare and records--but just wanted to let you know--hopefully everything will be become easier as Onesimo Luna will be on same system as Sonia Galvan is not. Enjoy your New Year Camelia and rest of holiday.   Franck Morning

## 2023-04-05 ENCOUNTER — OFFICE VISIT (OUTPATIENT)
Dept: SURGERY | Facility: CLINIC | Age: 68
End: 2023-04-05

## 2023-04-05 DIAGNOSIS — N20.0 STAGHORN CALCULUS: Primary | ICD-10-CM

## 2023-04-05 DIAGNOSIS — N52.9 ERECTILE DYSFUNCTION, UNSPECIFIED ERECTILE DYSFUNCTION TYPE: ICD-10-CM

## 2023-04-05 DIAGNOSIS — N39.0 RECURRENT UTI: ICD-10-CM

## 2023-04-05 DIAGNOSIS — N13.8 BPH WITH OBSTRUCTION/LOWER URINARY TRACT SYMPTOMS: ICD-10-CM

## 2023-04-05 DIAGNOSIS — N40.1 BPH WITH OBSTRUCTION/LOWER URINARY TRACT SYMPTOMS: ICD-10-CM

## 2023-04-05 DIAGNOSIS — R82.991 HYPOCITRATURIA: ICD-10-CM

## 2023-04-05 LAB
APPEARANCE: CLEAR
BILIRUBIN: NEGATIVE
GLUCOSE (URINE DIPSTICK): NEGATIVE MG/DL
KETONES (URINE DIPSTICK): NEGATIVE MG/DL
LEUKOCYTES: NEGATIVE
MULTISTIX LOT#: NORMAL NUMERIC
NITRITE, URINE: NEGATIVE
OCCULT BLOOD: NEGATIVE
PH, URINE: 6.5 (ref 4.5–8)
PROTEIN (URINE DIPSTICK): NEGATIVE MG/DL
SPECIFIC GRAVITY: 1.02 (ref 1–1.03)
URINE-COLOR: YELLOW
UROBILINOGEN,SEMI-QN: 0.2 MG/DL (ref 0–1.9)

## 2023-04-05 PROCEDURE — 99214 OFFICE O/P EST MOD 30 MIN: CPT | Performed by: UROLOGY

## 2023-04-05 PROCEDURE — 81003 URINALYSIS AUTO W/O SCOPE: CPT | Performed by: UROLOGY

## 2023-04-05 PROCEDURE — 51798 US URINE CAPACITY MEASURE: CPT | Performed by: UROLOGY

## 2023-04-22 ENCOUNTER — PATIENT MESSAGE (OUTPATIENT)
Dept: SURGERY | Facility: CLINIC | Age: 68
End: 2023-04-22

## 2023-04-24 ENCOUNTER — TELEPHONE (OUTPATIENT)
Dept: SURGERY | Facility: CLINIC | Age: 68
End: 2023-04-24

## 2023-04-24 RX ORDER — FINASTERIDE 5 MG/1
5 TABLET, FILM COATED ORAL DAILY
Qty: 90 TABLET | Refills: 5 | Status: SHIPPED | OUTPATIENT
Start: 2023-04-24 | End: 2023-07-23

## 2023-04-24 RX ORDER — TAMSULOSIN HYDROCHLORIDE 0.4 MG/1
0.4 CAPSULE ORAL DAILY
Qty: 90 CAPSULE | Refills: 5 | Status: SHIPPED | OUTPATIENT
Start: 2023-04-24 | End: 2023-07-23

## 2023-04-24 NOTE — TELEPHONE ENCOUNTER
----- Message from Vinod Garcia sent at 4/22/2023 10:40 AM CDT -----  Regarding: Off Prescriptions follow up  Contact: [phone]  Hi Dr Barbara Mane  Since going off Finasteride & Tamulosin it seems I am getting up more at night and more during day and stream doesn't appear to be as strong.   Let me know if you think I should go back on prescriptions or wait until next appt in Oct  thx

## 2023-04-24 NOTE — TELEPHONE ENCOUNTER
Pt's last OV was on 4/5/23. Discussed stopping Tamsulosin and Finasteride. MD: Please advise. Pt would like to restart above medications. Scripts are pended.

## 2023-04-25 NOTE — TELEPHONE ENCOUNTER
This encounter is now closed. See previous encounter.  Dr Barbara Mane approve resuming both Finasteride and Tamsulosin on 4/24

## 2023-04-26 ENCOUNTER — TELEPHONE (OUTPATIENT)
Dept: FAMILY MEDICINE CLINIC | Facility: CLINIC | Age: 68
End: 2023-04-26

## 2023-04-26 DIAGNOSIS — H35.9 RETINA DISORDER: Primary | ICD-10-CM

## 2023-04-26 NOTE — TELEPHONE ENCOUNTER
Patient's wife notified I have entered referral for Dr. Margie León M.D. She is given contact information for the [facility]. Referral faxed to the [facility].

## 2023-04-28 ENCOUNTER — TELEPHONE (OUTPATIENT)
Dept: FAMILY MEDICINE CLINIC | Facility: CLINIC | Age: 68
End: 2023-04-28

## 2023-04-28 DIAGNOSIS — H35.9 RETINA DISORDER: Primary | ICD-10-CM

## 2023-04-28 NOTE — TELEPHONE ENCOUNTER
Referral request Dr. Shantelle Montalvo M.D.    6 visits    Diagnosis: H35.9    Auth per [facility]: [id number] valid 4/28 to 10/24/2023

## 2023-05-17 ENCOUNTER — TELEPHONE (OUTPATIENT)
Dept: FAMILY MEDICINE CLINIC | Facility: CLINIC | Age: 68
End: 2023-05-17

## 2023-05-17 DIAGNOSIS — L98.9 DISORDER OF SKIN: Primary | ICD-10-CM

## 2023-05-31 ENCOUNTER — PATIENT MESSAGE (OUTPATIENT)
Dept: SURGERY | Facility: CLINIC | Age: 68
End: 2023-05-31

## 2023-05-31 NOTE — TELEPHONE ENCOUNTER
From: Escobar Johnson  To:  Dieter Siddiqi MD  Sent: 5/31/2023 1:53 PM CDT  Subject: Pot Citrate 1080mg prescription    Hi  My prescription end at end of June--So I will need to have the prescription renewed at [facility]--will be picking up my last 90 day prescription over the next couple of days  x

## 2023-08-15 DIAGNOSIS — R82.991 HYPOCITRATURIA: ICD-10-CM

## 2023-08-16 RX ORDER — POTASSIUM CITRATE 10 MEQ/1
10 TABLET, EXTENDED RELEASE ORAL DAILY
Qty: 90 TABLET | Refills: 1 | Status: SHIPPED | OUTPATIENT
Start: 2023-08-16

## 2023-09-25 ENCOUNTER — TELEPHONE (OUTPATIENT)
Dept: FAMILY MEDICINE CLINIC | Facility: CLINIC | Age: 68
End: 2023-09-25

## 2023-09-25 DIAGNOSIS — N20.0 CALCULUS OF KIDNEY: Primary | ICD-10-CM

## 2023-10-03 NOTE — PROGRESS NOTES
HPI:      Ingris Enriquez is a 77year old male with a PMH of right DVT with PE (s/p IVC filter in 2003), CKD 3    Following for:  1. H/o left staghorn calculus (60% CaPhos, 20% struvite, 20% CaOx). - s/p left PCNL 2/5/21  - s/p left PCNL in 2014 [facility]). Stones were radio-opaque. 2. hypocitraturia  - 24 h urine 4/15/20: high sodium (202), low citrate (406), good UOP 2400.  - on 10 urocit since 2021  3. Recurrent UTIs v chronic colonization of urinary tract  - multiple cultures growing Staph  4. BPH/LUTS  - aurora/pro 10/3/22 but stopped 4/5/23 and now back on both  5. ED  - 20 mg cialis prn    PCP - Cj/Davie  LOV 4/5/2023    Presents for check-up and review CT. He prefers not to take pills if possible. He feels well. No flank pain or hematuria. Occasional tingling with urination if not drinking enough water. No interim UTIs. He is taking 10 urocit and had considered stopping aurora/pro but istaking both. AUA is 7/35 with 3 n; 1 s, w, u, f. Was 19/35 with 7-8 n; 4 s, f; 2 w, u, I. Delighted with LUTS. Incontinence: rare UI/dribbles once every couple mo  DIANA LOV: ~ 40-50 g, no nodules or tenderness  UA is negative (prior UAs showed recurrent WBCs) Recent UCx x 3 grew Staph (not aureus), possible skin contaminant. PVR is zero - was 64, 114 mL. PSA 2.07 11/17/22    Reported ~ 30 oz water per day. 30 oz green tea or coffee. Now > 60 oz water per day with light yellow urine. Poor potency without meds, better potency with 20 mg cialis prn    CT 10/6/23: no  abnormalities, no stones. Vascular calcifications near left kidney. BPH  CT SP 8/17/22: minimal right hydro to bladder with mild BWT.  No stones in either kidney  KUB 3/7/22: few scattered 2-3 mm calcifications over left kidney  CT SP 2/5/21: 2 and 4 mm calcifications noted outside the ureter, likely from prior PCNL  CT SP 12/23/20: 2.3 x 1.5 left renal pelvis stone with several stones noted in LLP (largest measuring up to 1.2 cm, though this may be a cluster of stones). He has mild left renal atrophy but still appears to have decent renal parenchyma. I can see stone fragments exterior to kidney at the likely area of the prior PCNL tract. Will continue 10 urocit, wants to continue both flomax, proscar. Continue increased water intake for UTI and stone prevention. Continue 20 mg cialis prn Zoanne Fair). Wants to f/u in 1 y with KUB prior and if KUB negative is open to tapering imaging. Prior note:  Taking aurora/pro and urinary symptoms have gotten a lot better but he doesn't think these meds helped, he thinks it was 10 day course of abx. Would prefer stopping aurora/pro and see how things go. He presents as a consult from Dr Shanti Collins office with a left staghorn calculus. Underwent left PCNL with Dr Rod Swartz in 2014 for large stone. He had not had f/u imaging for stones following PCNL. I cannot view imaging but pre-op CT report indicates 2.7 x 2.2 with large LLP stones. Saw Dr Blanca Melgoza for rising Cr (1.0 to 1.49) since 2016. Renal US 12/3/20: large left renal pelvis stone with hydronephrosis. He feels good currently. He reports intermittent left flank pain with stretching or deep breaths but this is fairly mild. UA is positive for nitrates and PVR is 130 mL but he emptied again after we checked.     Gross hematuria: none  Tobacco hx: none  Fam h/o  malignancy: none    HISTORY:  Past Medical History:   Diagnosis Date    Brain bleed ([facility]) 7-    Calculus of kidney     DVT (deep venous thrombosis) ([facility]) 2003    history of right DVT    Alvaro filter in place 2003    PE (pulmonary embolism) 2003    history of right PE    Presence of other cardiac implants and grafts     patient denies cardiac stent/or graft    Pulmonary embolism ([facility])     Staghorn calculus     Left- Surgery scheduled 2-4-21 with Dr Tito Grady      Past Surgical History:   Procedure Laterality Date    ANGIOGRAM  2003    brain bleed    COLONOSCOPY      OTHER SURGICAL HISTORY Left 12/4/2014    Procedure: NEPHROLITHOTOMY PERCUTANEOUS;  Surgeon: Candelario Pate MD;  Location: [facility] MAIN OR      History reviewed. No pertinent family history. Social History:   Social History     Socioeconomic History    Marital status:    Tobacco Use    Smoking status: Never    Smokeless tobacco: Never   Vaping Use    Vaping Use: Never used   Substance and Sexual Activity    Alcohol use: Yes     Alcohol/week: 4.0 standard drinks of alcohol     Types: 4 Glasses of wine per week     Comment: occasional    Drug use: No   Other Topics Concern    Caffeine Concern Yes    Exercise Yes    Seat Belt Yes        Medications (Active prior to today's visit):  Current Outpatient Medications   Medication Sig Dispense Refill    finasteride 5 MG Oral Tab Take 1 tablet (5 mg total) by mouth daily. 90 tablet 5    potassium citrate 10 MEQ (1080 MG) Oral Tab CR Take 1 tablet (10 mEq total) by mouth daily. 90 tablet 1    Tadalafil (CIALIS) 20 MG Oral Tab Take 1 tablet (20 mg total) by mouth daily as needed for Erectile Dysfunction. 30 tablet 5    tamsulosin 0.4 MG Oral Cap Take 1 capsule (0.4 mg total) by mouth After Breakfast. TAKE 30 MINUTES AFTER MEAL 90 capsule 5       Allergies:  No Known Allergies      ROS:     A comprehensive 10 point review of systems was completed. Pertinent positives and negatives noted in the the HPI. PHYSICAL EXAM:     GENERAL APPEARANCE: well, developed, well nourished, in no acute distress  NEUROLOGIC: nonfocal, alert and oriented  HEAD: normocephalic, atraumatic  EYES: sclera non-icteric  EARS: hearing intact  ORAL CAVITY: mucosa moist  NECK/THYROID: no obvious goiter or masses  LUNGS: nonlabored breathing  ABDOMEN: soft, no obvious masses or tenderness  SKIN: no obvious rashes    : as noted above     ASSESSMENT/PLAN:   Diagnoses and all orders for this visit:    Staghorn calculus  -     URINALYSIS, AUTO, W/O SCOPE  -     XR ABDOMEN (1 VIEW) (CPT=74018);  Future    Hypocitraturia  - potassium citrate 10 MEQ (1080 MG) Oral Tab CR; Take 1 tablet (10 mEq total) by mouth daily. Recurrent UTI    BPH with obstruction/lower urinary tract symptoms  -     finasteride 5 MG Oral Tab; Take 1 tablet (5 mg total) by mouth daily. -     tamsulosin 0.4 MG Oral Cap; Take 1 capsule (0.4 mg total) by mouth After Breakfast. TAKE 30 MINUTES AFTER MEAL    Erectile dysfunction, unspecified erectile dysfunction type  -     Tadalafil (CIALIS) 20 MG Oral Tab; Take 1 tablet (20 mg total) by mouth daily as needed for Erectile Dysfunction. Prostate cancer screening  -     PSA Total, Screen; Future      - as noted above. Thanks again for this consult.     Geovanny Jay MD, [facility]  Urologist  [facility]  Office: [phone]

## 2023-10-06 ENCOUNTER — HOSPITAL ENCOUNTER (OUTPATIENT)
Dept: CT IMAGING | Age: 68
Discharge: HOME | End: 2023-10-06
Attending: UROLOGY
Payer: MEDICARE

## 2023-10-06 DIAGNOSIS — N20.0 STAGHORN CALCULUS: ICD-10-CM

## 2023-10-06 PROCEDURE — 74176 CT ABD & PELVIS W/O CONTRAST: CPT | Performed by: UROLOGY

## 2023-10-11 ENCOUNTER — OFFICE VISIT (OUTPATIENT)
Dept: SURGERY | Facility: CLINIC | Age: 68
End: 2023-10-11

## 2023-10-11 DIAGNOSIS — N13.8 BPH WITH OBSTRUCTION/LOWER URINARY TRACT SYMPTOMS: ICD-10-CM

## 2023-10-11 DIAGNOSIS — N40.1 BPH WITH OBSTRUCTION/LOWER URINARY TRACT SYMPTOMS: ICD-10-CM

## 2023-10-11 DIAGNOSIS — N39.0 RECURRENT UTI: ICD-10-CM

## 2023-10-11 DIAGNOSIS — R82.991 HYPOCITRATURIA: ICD-10-CM

## 2023-10-11 DIAGNOSIS — N20.0 STAGHORN CALCULUS: Primary | ICD-10-CM

## 2023-10-11 DIAGNOSIS — N52.9 ERECTILE DYSFUNCTION, UNSPECIFIED ERECTILE DYSFUNCTION TYPE: ICD-10-CM

## 2023-10-11 DIAGNOSIS — Z12.5 PROSTATE CANCER SCREENING: ICD-10-CM

## 2023-10-11 LAB
APPEARANCE: CLEAR
BILIRUBIN: NEGATIVE
GLUCOSE (URINE DIPSTICK): NEGATIVE MG/DL
KETONES (URINE DIPSTICK): NEGATIVE MG/DL
MULTISTIX LOT#: ABNORMAL NUMERIC
NITRITE, URINE: NEGATIVE
PH, URINE: 6 (ref 4.5–8)
PROTEIN (URINE DIPSTICK): NEGATIVE MG/DL
SPECIFIC GRAVITY: 1.01 (ref 1–1.03)
URINE-COLOR: YELLOW
UROBILINOGEN,SEMI-QN: 0.2 MG/DL (ref 0–1.9)

## 2023-10-11 PROCEDURE — 99214 OFFICE O/P EST MOD 30 MIN: CPT | Performed by: UROLOGY

## 2023-10-11 PROCEDURE — 1160F RVW MEDS BY RX/DR IN RCRD: CPT | Performed by: UROLOGY

## 2023-10-11 PROCEDURE — 51798 US URINE CAPACITY MEASURE: CPT | Performed by: UROLOGY

## 2023-10-11 PROCEDURE — 1159F MED LIST DOCD IN RCRD: CPT | Performed by: UROLOGY

## 2023-10-11 PROCEDURE — 81003 URINALYSIS AUTO W/O SCOPE: CPT | Performed by: UROLOGY

## 2023-10-11 RX ORDER — POTASSIUM CITRATE 10 MEQ/1
10 TABLET, EXTENDED RELEASE ORAL DAILY
Qty: 90 TABLET | Refills: 1 | Status: SHIPPED | OUTPATIENT
Start: 2023-10-11

## 2023-10-11 RX ORDER — TAMSULOSIN HYDROCHLORIDE 0.4 MG/1
0.4 CAPSULE ORAL
COMMUNITY
Start: 2023-08-15 | End: 2023-10-11

## 2023-10-11 RX ORDER — FINASTERIDE 5 MG/1
5 TABLET, FILM COATED ORAL DAILY
COMMUNITY
Start: 2023-08-20 | End: 2023-10-11

## 2023-10-11 RX ORDER — FINASTERIDE 5 MG/1
5 TABLET, FILM COATED ORAL DAILY
Qty: 90 TABLET | Refills: 5 | Status: SHIPPED | OUTPATIENT
Start: 2023-10-11

## 2023-10-11 RX ORDER — TAMSULOSIN HYDROCHLORIDE 0.4 MG/1
0.4 CAPSULE ORAL
Qty: 90 CAPSULE | Refills: 5 | Status: SHIPPED | OUTPATIENT
Start: 2023-10-11

## 2023-10-11 RX ORDER — TADALAFIL 20 MG/1
20 TABLET ORAL
Qty: 30 TABLET | Refills: 5 | Status: SHIPPED | OUTPATIENT
Start: 2023-10-11

## 2023-11-14 ENCOUNTER — TELEPHONE (OUTPATIENT)
Dept: FAMILY MEDICINE CLINIC | Facility: CLINIC | Age: 68
End: 2023-11-14

## 2023-11-14 DIAGNOSIS — Z12.5 SCREENING FOR MALIGNANT NEOPLASM OF PROSTATE: ICD-10-CM

## 2023-11-14 DIAGNOSIS — Z13.6 SCREENING FOR CARDIOVASCULAR CONDITION: ICD-10-CM

## 2023-11-14 DIAGNOSIS — Z87.442 HISTORY OF URIC ACID STAGHORN CALCULUS: ICD-10-CM

## 2023-11-14 DIAGNOSIS — N18.31 STAGE 3A CHRONIC KIDNEY DISEASE (HCC): Primary | ICD-10-CM

## 2023-11-14 NOTE — TELEPHONE ENCOUNTER
Please enter lab orders for the patient's upcoming physical appointment. Physical scheduled: Your appointments       Date & Time Appointment Department [facility])    Jan 03, 2024  7:30 AM MERCEDES THOMAS Supervisit with Julio Marsh MD [address], [address], [place] ([address])              [address], [address], [place] [address] [place] [address] [phone]           Preferred lab: Deb Reynaga       The patient has been notified to complete fasting labs prior to their physical appointment.

## 2023-11-20 ENCOUNTER — LAB ENCOUNTER (OUTPATIENT)
Dept: LAB | Age: 68
End: 2023-11-20
Attending: FAMILY MEDICINE
Payer: MEDICARE

## 2023-11-20 DIAGNOSIS — Z12.5 PROSTATE CANCER SCREENING: ICD-10-CM

## 2023-11-20 DIAGNOSIS — Z87.442 HISTORY OF URIC ACID STAGHORN CALCULUS: ICD-10-CM

## 2023-11-20 DIAGNOSIS — Z12.5 SCREENING FOR MALIGNANT NEOPLASM OF PROSTATE: ICD-10-CM

## 2023-11-20 LAB
ALBUMIN SERPL-MCNC: 4.1 G/DL (ref 3.4–5)
ALBUMIN/GLOB SERPL: 1.3 {RATIO} (ref 1–2)
ALP LIVER SERPL-CCNC: 57 U/L
ALT SERPL-CCNC: 10 U/L
ANION GAP SERPL CALC-SCNC: 6 MMOL/L (ref 0–18)
AST SERPL-CCNC: 22 U/L (ref 15–37)
BILIRUB SERPL-MCNC: 0.8 MG/DL (ref 0.1–2)
BUN BLD-MCNC: 18 MG/DL (ref 9–23)
CALCIUM BLD-MCNC: 9 MG/DL (ref 8.5–10.1)
CHLORIDE SERPL-SCNC: 108 MMOL/L (ref 98–112)
CHOLEST SERPL-MCNC: 153 MG/DL (ref ?–200)
CO2 SERPL-SCNC: 25 MMOL/L (ref 21–32)
COMPLEXED PSA SERPL-MCNC: 2.29 NG/ML (ref ?–4)
CREAT BLD-MCNC: 1.66 MG/DL
EGFRCR SERPLBLD CKD-EPI 2021: 44 ML/MIN/1.73M2 (ref 60–?)
FASTING PATIENT LIPID ANSWER: YES
FASTING STATUS PATIENT QL REPORTED: YES
GLOBULIN PLAS-MCNC: 3.2 G/DL (ref 2.8–4.4)
GLUCOSE BLD-MCNC: 108 MG/DL (ref 70–99)
HDLC SERPL-MCNC: 71 MG/DL (ref 40–59)
LDLC SERPL CALC-MCNC: 71 MG/DL (ref ?–100)
NONHDLC SERPL-MCNC: 82 MG/DL (ref ?–130)
OSMOLALITY SERPL CALC.SUM OF ELEC: 290 MOSM/KG (ref 275–295)
POTASSIUM SERPL-SCNC: 4.1 MMOL/L (ref 3.5–5.1)
PROT SERPL-MCNC: 7.3 G/DL (ref 6.4–8.2)
SODIUM SERPL-SCNC: 139 MMOL/L (ref 136–145)
TRIGL SERPL-MCNC: 51 MG/DL (ref 30–149)
URATE SERPL-MCNC: 4.6 MG/DL
VLDLC SERPL CALC-MCNC: 8 MG/DL (ref 0–30)

## 2023-11-20 PROCEDURE — 84550 ASSAY OF BLOOD/URIC ACID: CPT

## 2023-11-20 PROCEDURE — 80061 LIPID PANEL: CPT | Performed by: FAMILY MEDICINE

## 2023-11-20 PROCEDURE — 36415 COLL VENOUS BLD VENIPUNCTURE: CPT | Performed by: FAMILY MEDICINE

## 2023-11-20 PROCEDURE — 80053 COMPREHEN METABOLIC PANEL: CPT | Performed by: FAMILY MEDICINE

## 2023-12-12 ENCOUNTER — TELEPHONE (OUTPATIENT)
Dept: FAMILY MEDICINE CLINIC | Facility: CLINIC | Age: 68
End: 2023-12-12

## 2023-12-12 DIAGNOSIS — Z12.83 SKIN EXAM, SCREENING FOR CANCER: ICD-10-CM

## 2023-12-12 DIAGNOSIS — L98.9 SKIN LESION: Primary | ICD-10-CM

## 2024-01-02 ENCOUNTER — ORDER TRANSCRIPTION (OUTPATIENT)
Dept: ADMINISTRATIVE | Facility: HOSPITAL | Age: 69
End: 2024-01-02

## 2024-01-02 DIAGNOSIS — Z13.6 SCREENING FOR CARDIOVASCULAR CONDITION: Primary | ICD-10-CM

## 2024-01-03 ENCOUNTER — OFFICE VISIT (OUTPATIENT)
Dept: FAMILY MEDICINE CLINIC | Facility: CLINIC | Age: 69
End: 2024-01-03
Payer: MEDICARE

## 2024-01-03 VITALS
SYSTOLIC BLOOD PRESSURE: 130 MMHG | BODY MASS INDEX: 24.11 KG/M2 | OXYGEN SATURATION: 98 % | HEIGHT: 72 IN | DIASTOLIC BLOOD PRESSURE: 64 MMHG | HEART RATE: 58 BPM | WEIGHT: 178 LBS | RESPIRATION RATE: 16 BRPM

## 2024-01-03 DIAGNOSIS — Z00.00 ENCOUNTER FOR ANNUAL HEALTH EXAMINATION: Primary | ICD-10-CM

## 2024-01-03 DIAGNOSIS — N18.32 STAGE 3B CHRONIC KIDNEY DISEASE (HCC): ICD-10-CM

## 2024-01-03 DIAGNOSIS — Z86.711 HISTORY OF PULMONARY EMBOLUS (PE): ICD-10-CM

## 2024-01-03 DIAGNOSIS — Z87.442 HISTORY OF URIC ACID STAGHORN CALCULUS: ICD-10-CM

## 2024-01-03 PROCEDURE — 1126F AMNT PAIN NOTED NONE PRSNT: CPT | Performed by: FAMILY MEDICINE

## 2024-01-03 PROCEDURE — 3075F SYST BP GE 130 - 139MM HG: CPT | Performed by: FAMILY MEDICINE

## 2024-01-03 PROCEDURE — 1170F FXNL STATUS ASSESSED: CPT | Performed by: FAMILY MEDICINE

## 2024-01-03 PROCEDURE — G0438 PPPS, INITIAL VISIT: HCPCS | Performed by: FAMILY MEDICINE

## 2024-01-03 PROCEDURE — 1159F MED LIST DOCD IN RCRD: CPT | Performed by: FAMILY MEDICINE

## 2024-01-03 PROCEDURE — 3008F BODY MASS INDEX DOCD: CPT | Performed by: FAMILY MEDICINE

## 2024-01-03 PROCEDURE — 1160F RVW MEDS BY RX/DR IN RCRD: CPT | Performed by: FAMILY MEDICINE

## 2024-01-03 PROCEDURE — 96160 PT-FOCUSED HLTH RISK ASSMT: CPT | Performed by: FAMILY MEDICINE

## 2024-01-03 PROCEDURE — 3078F DIAST BP <80 MM HG: CPT | Performed by: FAMILY MEDICINE

## 2024-01-03 NOTE — PROGRESS NOTES
Subjective:   Sridhar Melton is a 68 year old male who presents for a MA (Medicare Advantage) Supervisit (Once per calendar year) and scheduled follow up of multiple significant but stable problems.   Preventative Screening  Colonoscopy - 1/2020.  Repeat 5 yrs.   Prostate - 2.29.  urination stable.  On finasteride and Tamsulosin.   Immunizations - TDaP 2021.  PNA 23 in 2011.      H/o PE - in 2003.  From brain bleed, then developed blood clot while in hospital.  No issues since.      CKD - saw Dr. Pearson in the past.  Started after kidney stones.  Watching numbers over time.     History/Other:   Fall Risk Assessment:   He has been screened for Falls and is low risk.      Cognitive Assessment:   He had a completely normal cognitive assessment - see flowsheet entries     Functional Ability/Status:   Sridhar Melton has a completely normal functional assessment. See flowsheet for details.      Depression Screening (PHQ-2/PHQ-9): PHQ-2 SCORE: 0, done 1/3/2024   If you checked off any problems, how difficult have these problems made it for you to do your work, take care of things at home, or get along with other people?: Not difficult at all            Advanced Directives:   He does have a Living Will but we do NOT have it on file in Epic.    He does have a POA but we do NOT have it on file in Epic.    Discussed Advance Care Planning with patient (and family/surrogate if present). Standard forms made available to patient in After Visit Summary.      Patient Active Problem List   Diagnosis    Stage 3a chronic kidney disease (HCC)    History of uric acid staghorn calculus    History of pulmonary embolus (PE) 2003 s/p IVC filter     Allergies:  He has No Known Allergies.    Current Medications:  Outpatient Medications Marked as Taking for the 1/3/24 encounter (Office Visit) with Zi Broderick MD   Medication Sig    finasteride 5 MG Oral Tab Take 1 tablet (5 mg total) by mouth daily.    potassium citrate 10 MEQ (1080  MG) Oral Tab CR Take 1 tablet (10 mEq total) by mouth daily.    Tadalafil (CIALIS) 20 MG Oral Tab Take 1 tablet (20 mg total) by mouth daily as needed for Erectile Dysfunction.    tamsulosin 0.4 MG Oral Cap Take 1 capsule (0.4 mg total) by mouth After Breakfast. TAKE 30 MINUTES AFTER MEAL       Medical History:  He  has a past medical history of Brain bleed ([facility]) (7-), Calculus of kidney, DVT (deep venous thrombosis) ([facility]) (2003), Tatums filter in place (2003), PE (pulmonary embolism) (2003), Presence of other cardiac implants and grafts, Pulmonary embolism ([facility]), and Staghorn calculus.  Surgical History:  He  has a past surgical history that includes angiogram (2003); other surgical history (Left, 12/4/2014); and colonoscopy.   Family History:  His family history is not on file.  Social History:  He  reports that he has never smoked. He has never used smokeless tobacco. He reports current alcohol use of about 4.0 standard drinks of alcohol per week. He reports that he does not use drugs.    Tobacco:  He has never smoked tobacco.    CAGE Alcohol Screen:   CAGE screening score of 0 on 1/3/2024, showing low risk of alcohol abuse.      Patient Care Team:  Zi Broderick MD as PCP - General (Family Medicine)  Ayan Solares DO as PCP (Family Practice)  Femi Pearson MD (NEPHROLOGY)  Haroldo Diaz MD (Family Medicine)    Review of Systems  Constitutional: negative  Eyes: negative  Ears, nose, mouth, throat, and face: negative  Respiratory: negative  Cardiovascular: negative  Gastrointestinal: negative  Genitourinary: negative  Neurological: negative      Objective:   Physical Exam  General Appearance:  Alert, cooperative, no distress, appears stated age   Head:  Normocephalic, without obvious abnormality, atraumatic   Eyes:  PERRL, conjunctiva/corneas clear, EOM's intact   Neck: Supple, symmetrical, trachea midline, no adenopathy   Back:   Symmetric, no curvature, ROM normal, no CVA tenderness   Lungs:    Clear to auscultation bilaterally, respirations unlabored   Chest Wall:  No tenderness or deformity   Heart:  Regular rate and rhythm, S1, S2 normal, no murmur, rub or gallop   Abdomen:   Soft, non-tender, bowel sounds active all four quadrants,  no masses, no organomegaly   Extremities: Extremities normal, atraumatic, no cyanosis or edema   Pulses: 2+ and symmetric   Skin: Skin color, texture, turgor normal, no rashes or lesions   Neurologic: Normal      /64   Pulse 58   Resp 16   Ht 6' (1.829 m)   Wt 178 lb (80.7 kg)   SpO2 98%   BMI 24.14 kg/m²  Estimated body mass index is 24.14 kg/m² as calculated from the following:    Height as of this encounter: 6' (1.829 m).    Weight as of this encounter: 178 lb (80.7 kg).    Medicare Hearing Assessment:   Hearing Screening    Time taken: 1/3/2024  7:23 AM  Screening Method: Whisper Test  Whisper Test Result: Pass               Visual Acuity:   Right Eye Visual Acuity: Uncorrected Right Eye Chart Acuity: 20/30   Left Eye Visual Acuity: Uncorrected Left Eye Chart Acuity: 20/25   Both Eyes Visual Acuity: Uncorrected Both Eyes Chart Acuity: 20/20   Able To Tolerate Visual Acuity: Yes        Assessment & Plan:     Sridhar Melton was seen in the office today:  had concerns including Physical (MAW).    1. Encounter for annual health examination  Overall well  Healthy diet and exercise  Work on increasing hydration  Colonoscopy up-to-date.  Repeat in 1 year  PSA reassuring  Immunizations discussed.  Eligible for pneumonia shot and shingles vaccine    2. Stage 3b chronic kidney disease (HCC)  Monitoring over time.  Slow increase.  Stage 3b on recent labs  Stay hydrated. Will continue to monitor.     3. History of uric acid staghorn calculus  Noted, this initiated he CKD  Prevention with potassium supplementation, hydration.   CT scan 2023 shows no stone.     4. History of pulmonary embolus (PE) 2003 s/p IVC filter  From hospitalization in 2003  S/p warfarin and IVC  filter  No recurrence since.             The patient indicates understanding of these issues and agrees to the plan.  Reinforced healthy diet, lifestyle, and exercise.      Return for Wellness Visit.     Zi Broderick MD, 1/3/2024     Supplementary Documentation:   General Health:  In the past six months, have you lost more than 10 pounds without trying?: 2 - No  Has your appetite been poor?: No  Type of Diet: Balanced  How does the patient maintain a good energy level?: Appropriate Exercise  How would you describe your daily physical activity?: Heavy  How would you describe your current health state?: Good  How do you maintain positive mental well-being?: Social Interaction  On a scale of 0 to 10, with 0 being no pain and 10 being severe pain, what is your pain level?: 0 - (None)  In the past six months, have you experienced urine leakage?: 0-No  At any time do you feel concerned for the safety/well-being of yourself and/or your children, in your home or elsewhere?: No  Have you had any immunizations at another office such as Influenza, Hepatitis B, Tetanus, or Pneumococcal?: No        Sridhar Melton's SCREENING SCHEDULE   Tests on this list are recommended by your physician but may not be covered, or covered at this frequency, by your insurer.   Please check with your insurance carrier before scheduling to verify coverage.   PREVENTATIVE SERVICES FREQUENCY &  COVERAGE DETAILS LAST COMPLETION DATE   Diabetes Screening    Fasting Blood Sugar / Glucose    One screening every 12 months if never tested or if previously tested but not diagnosed with pre-diabetes   One screening every 6 months if diagnosed with pre-diabetes Lab Results   Component Value Date     (H) 11/20/2023        Cardiovascular Disease Screening    Lipid Panel  Cholesterol  Lipoprotein (HDL)  Triglycerides Covered every 5 years for all Medicare beneficiaries without apparent signs or symptoms of cardiovascular disease Lab Results    Component Value Date    CHOLEST 153 11/20/2023    HDL 71 (H) 11/20/2023    LDL 71 11/20/2023    TRIG 51 11/20/2023         Electrocardiogram (EKG)   Covered if needed at Welcome to Medicare, and non-screening if indicated for medical reasons 02/01/2021      Ultrasound Screening for Abdominal Aortic Aneurysm (AAA) Covered once in a lifetime for one of the following risk factors    Men who are 65-75 years old and have ever smoked    Anyone with a family history -     Colorectal Cancer Screening  Covered for ages 50-85; only need ONE of the following:    Colonoscopy   Covered every 10 years    Covered every 2 years if patient is at high risk or previous colonoscopy was abnormal 01/20/2020    Health Maintenance   Topic Date Due    Colorectal Cancer Screening  01/20/2025       Flexible Sigmoidoscopy   Covered every 4 years -    Fecal Occult Blood Test Covered annually -   Prostate Cancer Screening    Prostate-Specific Antigen (PSA) Annually No results found for: \"PSA\"  Health Maintenance   Topic Date Due    PSA  11/20/2025      Immunizations    Influenza Covered once per flu season  Please get every year 11/09/2023  No recommendations at this time    Pneumococcal Each vaccine ([id number] & [id number]) covered once after 65 Prevnar 13: -    [id number]: 12/27/2011     Pneumococcal Vaccination(2 - PCV) due on 06/18/2018    Hepatitis B One screening covered for patients with certain risk factors   -  No recommendations at this time    Tetanus Toxoid Not covered by Medicare Part B unless medically necessary (cut with metal); may be covered with your pharmacy prescription benefits -    Tetanus, Diptheria and Pertusis TD and TDaP Not covered by Medicare Part B -  No recommendations at this time    Zoster Not covered by Medicare Part B; may be covered with your pharmacy  prescription benefits -  Zoster Vaccines(1 of 2) Never done

## 2024-01-03 NOTE — PATIENT INSTRUCTIONS
Sridhar Melton's SCREENING SCHEDULE   Tests on this list are recommended by your physician but may not be covered, or covered at this frequency, by your insurer.   Please check with your insurance carrier before scheduling to verify coverage.   PREVENTATIVE SERVICES FREQUENCY &  COVERAGE DETAILS LAST COMPLETION DATE   Diabetes Screening    Fasting Blood Sugar / Glucose    One screening every 12 months if never tested or if previously tested but not diagnosed with pre-diabetes   One screening every 6 months if diagnosed with pre-diabetes Lab Results   Component Value Date     (H) 11/20/2023        Cardiovascular Disease Screening    Lipid Panel  Cholesterol  Lipoprotein (HDL)  Triglycerides Covered every 5 years for all Medicare beneficiaries without apparent signs or symptoms of cardiovascular disease Lab Results   Component Value Date    CHOLEST 153 11/20/2023    HDL 71 (H) 11/20/2023    LDL 71 11/20/2023    TRIG 51 11/20/2023         Electrocardiogram (EKG)   Covered if needed at Welcome to Medicare, and non-screening if indicated for medical reasons 02/01/2021      Ultrasound Screening for Abdominal Aortic Aneurysm (AAA) Covered once in a lifetime for one of the following risk factors   • Men who are 65-75 years old and have ever smoked   • Anyone with a family history -     Colorectal Cancer Screening  Covered for ages 50-85; only need ONE of the following:    Colonoscopy   Covered every 10 years    Covered every 2 years if patient is at high risk or previous colonoscopy was abnormal 01/20/2020    Health Maintenance   Topic Date Due   • Colorectal Cancer Screening  01/20/2025       Flexible Sigmoidoscopy   Covered every 4 years -    Fecal Occult Blood Test Covered annually -   Prostate Cancer Screening    Prostate-Specific Antigen (PSA) Annually No results found for: \"PSA\"  Health Maintenance   Topic Date Due   • PSA  11/20/2025      Immunizations    Influenza Covered once per flu season  Please get  every year -  No recommendations at this time    Pneumococcal Each vaccine ([id number] & [id number]) covered once after 65 Prevnar 13: -    [id number]: 12/27/2011     Pneumococcal Vaccination(2 - PCV) due on 06/18/2018    Hepatitis B One screening covered for patients with certain risk factors   -  No recommendations at this time    Tetanus Toxoid Not covered by Medicare Part B unless medically necessary (cut with metal); may be covered with your pharmacy prescription benefits -    Tetanus, Diptheria and Pertusis TD and TDaP Not covered by Medicare Part B -  No recommendations at this time    Zoster Not covered by Medicare Part B; may be covered with your pharmacy  prescription benefits -  Zoster Vaccines(1 of 2) Never done

## 2024-01-05 ENCOUNTER — HOSPITAL ENCOUNTER (OUTPATIENT)
Dept: CT IMAGING | Facility: HOSPITAL | Age: 69
Discharge: HOME | End: 2024-01-05
Attending: FAMILY MEDICINE

## 2024-01-05 DIAGNOSIS — Z13.6 SCREENING FOR CARDIOVASCULAR CONDITION: ICD-10-CM

## 2024-01-05 NOTE — PROGRESS NOTES
Date of Service 1/5/2024    HEIDY DICK  Date of Birth 6/18/1955    Patient Age: 68 year old    PCP: Zi Broderick MD  [address]  [facility] [address]    Heart Scan Consult  Preliminary Heart Scan Score: 0    Previous Screening  Heart Scan Completed Previously: No        Peripheral Vascular Scan Completed Previously: No          Risk Factors  Personal Risk Factors  Non-alterable Risk Factors: Personal History;Age;Gender;Family History  Alterable Risk Factors: Unhealthy eating          Blood Pressure  There were no vitals taken for this visit.  (Normal =< 120/80,  Elevated = 120-129/ >80,  High Stage1 130-139/80-89 , Stage2 >140/>90)    Lipid Profile  Cholesterol: 153, done on 11/20/2023.  HDL Cholesterol: 71, done on 11/20/2023.  LDL Cholesterol: 71, done on 11/20/2023.  TriGlycerides 51, done on 11/20/2023.    Cholesterol Goals  Value   Total  =< 200   HDL  = > 45 Men = > 55 Women   LDL   =< 100   Triglycerides  =< 150       Glucose and Hemoglobin A1C  Lab Results   Component Value Date     (H) 11/20/2023     (Normal Fasting Glucose < 100mg/dl )    Nurse Review  Risk factor information and results reviewed with Nurse: Yes    Recommended Follow Up:  Consult your physician regarding::   Final Heart Scan Report;  Discuss potential for Incidental Finding      Recommendations for Change:  Nutrition Changes: Low Saturated Fat;Low Fat Dairy;Increase Fiber    Cholesterol Modification (goal of therapy depends upon your risk): No Change Needed     (Today's FASTING Ch[employer] Values:  Total Cholesterol-166, HDL-68, LDL-87, Triglycerides-54, Glucose-88)    Exercise: Enhance Current Program                   Repeat Heart Scan:   5 years if Calcium Score is 0.0              [place] Recommended Resources:  Recommended Resources: Upcoming Classes, Medical Services and Health Library [email or web address];    PV Screening  Recommended PV Screening: Abdomen;Ankle-Brachial Index (CHRIST);Carotids          Cruz CHEN RN        Please Contact the Nurse Heart Line with any Questions or Concerns [phone].

## 2024-07-08 ENCOUNTER — OFFICE VISIT (OUTPATIENT)
Dept: FAMILY MEDICINE CLINIC | Facility: CLINIC | Age: 69
End: 2024-07-08
Payer: MEDICARE

## 2024-07-08 VITALS
BODY MASS INDEX: 23.45 KG/M2 | RESPIRATION RATE: 16 BRPM | SYSTOLIC BLOOD PRESSURE: 130 MMHG | DIASTOLIC BLOOD PRESSURE: 62 MMHG | WEIGHT: 173.13 LBS | HEIGHT: 72 IN | OXYGEN SATURATION: 98 % | HEART RATE: 75 BPM

## 2024-07-08 DIAGNOSIS — R30.0 DYSURIA: Primary | ICD-10-CM

## 2024-07-08 LAB
APPEARANCE: CLEAR
BILIRUBIN: NEGATIVE
GLUCOSE (URINE DIPSTICK): NEGATIVE MG/DL
KETONES (URINE DIPSTICK): NEGATIVE MG/DL
MULTISTIX LOT#: ABNORMAL NUMERIC
NITRITE, URINE: NEGATIVE
PH, URINE: 6 (ref 4.5–8)
PROTEIN (URINE DIPSTICK): NEGATIVE MG/DL
SPECIFIC GRAVITY: 1 (ref 1–1.03)
URINE-COLOR: YELLOW
UROBILINOGEN,SEMI-QN: 2 MG/DL (ref 0–1.9)

## 2024-07-08 PROCEDURE — 87086 URINE CULTURE/COLONY COUNT: CPT | Performed by: FAMILY MEDICINE

## 2024-07-08 RX ORDER — CIPROFLOXACIN 500 MG/1
500 TABLET, FILM COATED ORAL 2 TIMES DAILY
Qty: 14 TABLET | Refills: 0 | Status: SHIPPED | OUTPATIENT
Start: 2024-07-08 | End: 2024-07-15

## 2024-07-08 NOTE — PROGRESS NOTES
Chief Complaint   Patient presents with    Painful Urination     Patient here for urine discomfort      HPI:   Sridhar Melton is a 69 year old male who presents to the office for urine discussion.  Having a discomfort when initiating and at times when ending his urine stream.  This has happened before, and has been associated with staghorn calculi.      Today had an episode of uncontrollable shakes.  Lasted about 15 min.    In the last month, waking up 6-10 times at night.        REVIEW OF SYSTEMS:   Pertinent items are noted in HPI.  EXAM:   /62   Pulse 75   Resp 16   Ht 6' (1.829 m)   Wt 173 lb 2 oz (78.5 kg)   SpO2 98%   BMI 23.48 kg/m²     General appearance - alert, well appearing, and in no distress  Chest - clear to auscultation, no wheezes, rales or rhonchi, symmetric air entry  Heart - normal rate, regular rhythm, normal S1, S2, no murmurs, rubs, clicks or gallops  Abdomen - soft, nontender, nondistended, no masses or organomegaly  Extremities - peripheral pulses normal, no pedal edema, no clubbing or cyanosis    Results for orders placed or performed in visit on 07/08/24   URINALYSIS, AUTO, W/O SCOPE    Collection Time: 07/08/24  4:15 PM   Result Value Ref Range    Glucose Urine Negative Negative mg/dL    Bilirubin Urine Negative Negative    Ketones, UA Negative Negative - Trace mg/dL    Spec Gravity 1.005 1.005 - 1.030    Blood Urine Trace-intact (A) Negative    PH Urine 6.0 5.0 - 8.0    Protein Urine Negative Negative - Trace mg/dL    Urobilinogen Urine 2.0 (A) 0.2 - 1.0 mg/dL    Nitrite Urine Negative Negative    Leukocyte Esterase Urine Small (A) Negative    APPEARANCE clear Clear    Color Urine yellow Yellow    Multistix Lot# [id number] Numeric    Multistix Expiration Date 11/30/24 Date        ASSESSMENT AND PLAN:     Sridhar Melton was seen in the office today:  had concerns including Painful Urination (Patient here for urine discomfort).    1. Dysuria  For several months  Episode of  shaking, chills today  Possible UTI an dpyelo?  Will send culture  Start Cipro  Planning to see urology in a few months.   - Urine Culture, Routine [E]  - URINALYSIS, AUTO, W/O SCOPE  - ciprofloxacin 500 MG Oral Tab; Take 1 tablet (500 mg total) by mouth 2 (two) times daily for 7 days.  Dispense: 14 tablet; Refill: 0      Zi Broderick M.D.   [facility] 3  07/08/24

## 2024-07-28 LAB — AMB EXT OCCULT BLOOD RESULT: NEGATIVE

## 2024-08-25 DIAGNOSIS — R82.991 HYPOCITRATURIA: ICD-10-CM

## 2024-08-26 RX ORDER — POTASSIUM CITRATE 10 MEQ/1
10 TABLET, EXTENDED RELEASE ORAL DAILY
Qty: 90 TABLET | Refills: 0 | Status: SHIPPED | OUTPATIENT
Start: 2024-08-26

## 2024-08-28 ENCOUNTER — TELEPHONE (OUTPATIENT)
Dept: CASE MANAGEMENT | Age: 69
End: 2024-08-28

## 2024-08-28 NOTE — TELEPHONE ENCOUNTER
Dr. Aguilar,    Patient's wife is requesting the 10/11/23 XR ABDOMEN order be faxed to [facility].   Patient is not scheduled yet and they need the order prior to scheduling.      XR ABDOMEN (1 VIEW) (CPT=74018) (Order #[id number]) on 10/11/23     Fax [phone]    If you have any questions, please call wife.

## 2024-08-29 ENCOUNTER — TELEPHONE (OUTPATIENT)
Dept: FAMILY MEDICINE CLINIC | Facility: CLINIC | Age: 69
End: 2024-08-29

## 2024-08-29 DIAGNOSIS — N20.0 CALCULUS OF KIDNEY: Primary | ICD-10-CM

## 2024-09-09 ENCOUNTER — HOSPITAL ENCOUNTER (OUTPATIENT)
Dept: GENERAL RADIOLOGY | Facility: HOSPITAL | Age: 69
Discharge: HOME | End: 2024-09-09
Attending: UROLOGY
Payer: MEDICARE

## 2024-09-09 DIAGNOSIS — N20.0 STAGHORN CALCULUS: ICD-10-CM

## 2024-09-09 PROCEDURE — 74018 RADEX ABDOMEN 1 VIEW: CPT | Performed by: UROLOGY

## 2024-10-01 NOTE — PROGRESS NOTES
HPI:      Sridhar Melton is a 69 year old male with a PMH of right DVT with PE (s/p IVC filter in 2003), CKD 3    Following for:  1. H/o left staghorn calculus (60% CaPhos, 20% struvite, 20% CaOx).  - s/p left PCNL 2/5/21  - [facility] Stones were radio-opaque.  2. hypocitraturia  - 24 h urine 4/15/20: high sodium (202), low citrate (406), good UOP 2400.  - on 10 urocit since 2021  3. Recurrent UTIs v chronic colonization of urinary tract  - multiple cultures growing Staph  4. BPH/LUTS  - aurora/pro 10/3/22 but stopped 4/5/23 and now back on both  5. ED  - 20 mg cialis prn    PCP - [facility]  LOV 10/11/23  Presents for check-up and review KUB. He prefers not to take pills if possible.    He feels well.  Had frequency/urgency in July and given 7 days abx  No flank pain or hematuria. Occasional tingling with urination if not drinking enough water. No interim UTIs.    He is taking 10 urocit, aurora/pro.    AUA is 7/35 with 3 n; 1 s, w, u, f. Was 19/35 with 7-8 n; 4 s, f; 2 w, u, I. Delighted with LUTS.  Incontinence: rare UI/dribbles once every couple mo  DIANA LOV: ~ 40-50 g, no nodules or tenderness  UA is negative (prior UAs showed recurrent WBCs) Recent UCx x 3 grew Staph (not aureus), possible skin contaminant.    PVR was zero, 64, 114 mL.    PSA 2.29 11/20/23    Reported ~ 30 oz water per day. 30 oz green tea or coffee. Now > 60 oz water per day with light yellow urine.    Poor potency without meds, better potency with 20 mg cialis prn    KUB 9/9/24: tiny possible L renal stones v vascular calcifications  CT 10/6/23: no  abnormalities, no stones. Vascular calcifications near left kidney. BPH  CT SP 8/17/22: minimal right hydro to bladder with mild BWT. No stones in either kidney  KUB 3/7/22: few scattered 2-3 mm calcifications over left kidney  CT SP 2/5/21: 2 and 4 mm calcifications noted outside the ureter, likely from prior PCNL  CT SP 12/23/20: 2.3 x 1.5 left renal pelvis stone with  several stones noted in LLP (largest measuring up to 1.2 cm, though this may be a cluster of stones). He has mild left renal atrophy but still appears to have decent renal parenchyma. I can see stone fragments exterior to kidney at the likely area of the prior PCNL tract.    Drinks ~ 40-60 oz water with light yellow urine. I encouraged the pt drink at least 40-60 oz water per day or enough to keep urine clear to pale yellow for UTI prevention.    Will continue 10 urocit, wants to continue both flomax, proscar. Continue increased water intake for UTI and stone prevention.  Continue 20 mg cialis prn ([employer]). Prefers annual imaging so will f/u in 1 y with CT prior.    Prior note:  Taking aurora/pro and urinary symptoms have gotten a lot better but he doesn't think these meds helped, he thinks it was 10 day course of abx. Would prefer stopping aurora/pro and see how things go.    He presents as a consult from Dr Diaz's office with a left staghorn calculus.    Underwent left PCNL with Dr Cummings in 2014 for large stone.  He had not had f/u imaging for stones following PCNL. I cannot view imaging but pre-op CT report indicates 2.7 x 2.2 with large LLP stones.  Saw Dr Pearson for rising Cr (1.0 to 1.49) since 2016. Renal US 12/3/20: large left renal pelvis stone with hydronephrosis.    He feels good currently.  He reports intermittent left flank pain with stretching or deep breaths but this is fairly mild.    UA is positive for nitrates and PVR is 130 mL but he emptied again after we checked.    Gross hematuria: none  Tobacco hx: none  Fam h/o  malignancy: none    HISTORY:  Past Medical History:    Brain bleed (HCC)    Calculus of kidney    DVT (deep venous thrombosis) (HCC)    history of right DVT    Hayden filter in place    PE (pulmonary embolism)    history of right PE    Presence of other cardiac implants and grafts    patient denies cardiac stent/or graft    Pulmonary embolism (HCC)    Staghorn calculus    Left-  Surgery scheduled 2-4-21 with Dr Aguilar      Past Surgical History:   Procedure Laterality Date    Angiogram  2003    brain bleed    Colonoscopy      Other surgical history Left 12/4/2014    Procedure: NEPHROLITHOTOMY PERCUTANEOUS;  Surgeon: Na Cummings MD;  Location:  MAIN OR      History reviewed. No pertinent family history.   Social History:   Social History     Socioeconomic History    Marital status:    Tobacco Use    Smoking status: Never    Smokeless tobacco: Never   Vaping Use    Vaping status: Never Used   Substance and Sexual Activity    Alcohol use: Yes     Alcohol/week: 4.0 standard drinks of alcohol     Types: 4 Glasses of wine per week     Comment: occasional    Drug use: No   Other Topics Concern    Caffeine Concern Yes     Comment: 1 coffee 1 tea    Exercise Yes     Comment: 5x a week    Seat Belt Yes        Medications (Active prior to today's visit):  Current Outpatient Medications   Medication Sig Dispense Refill    finasteride 5 MG Oral Tab Take 1 tablet (5 mg total) by mouth daily. 90 tablet 5    potassium citrate 10 MEQ (1080 MG) Oral Tab CR Take 1 tablet (10 mEq total) by mouth daily. 90 tablet 0    Tadalafil (CIALIS) 20 MG Oral Tab Take 1 tablet (20 mg total) by mouth daily as needed for Erectile Dysfunction. 30 tablet 5    tamsulosin 0.4 MG Oral Cap Take 1 capsule (0.4 mg total) by mouth daily. 90 capsule 5       Allergies:  No Known Allergies      ROS:     A comprehensive 10 point review of systems was completed.  Pertinent positives and negatives noted in the the HPI.    PHYSICAL EXAM:     GENERAL APPEARANCE: well, developed, well nourished, in no acute distress  NEUROLOGIC: nonfocal, alert and oriented  HEAD: normocephalic, atraumatic  EYES: sclera non-icteric  EARS: hearing intact  ORAL CAVITY: mucosa moist  NECK/THYROID: no obvious goiter or masses  LUNGS: nonlabored breathing  ABDOMEN: soft, no obvious masses or tenderness  SKIN: no obvious rashes    : as noted  above     ASSESSMENT/PLAN:   Diagnoses and all orders for this visit:    Hypocitraturia  -     URINALYSIS, AUTO, W/O SCOPE  -     potassium citrate 10 MEQ (1080 MG) Oral Tab CR; Take 1 tablet (10 mEq total) by mouth daily.    Staghorn calculus  -     URINALYSIS, AUTO, W/O SCOPE  -     CT ABDOMEN+PELVIS KIDNEYSTONE 2D RNDR(NO IV,NO ORAL)(CPT=74176); Future    Recurrent UTI  -     URINALYSIS, AUTO, W/O SCOPE    BPH with obstruction/lower urinary tract symptoms  -     URINALYSIS, AUTO, W/O SCOPE  -     finasteride 5 MG Oral Tab; Take 1 tablet (5 mg total) by mouth daily.  -     tamsulosin 0.4 MG Oral Cap; Take 1 capsule (0.4 mg total) by mouth daily.    Erectile dysfunction, unspecified erectile dysfunction type  -     URINALYSIS, AUTO, W/O SCOPE  -     Tadalafil (CIALIS) 20 MG Oral Tab; Take 1 tablet (20 mg total) by mouth daily as needed for Erectile Dysfunction.    - as noted above.    Thanks again for this consult.    Sal Aguilar MD, FACS  Urologist  [facility]  Office: [phone]

## 2024-10-11 ENCOUNTER — OFFICE VISIT (OUTPATIENT)
Dept: SURGERY | Facility: CLINIC | Age: 69
End: 2024-10-11
Payer: COMMERCIAL

## 2024-10-11 DIAGNOSIS — N52.9 ERECTILE DYSFUNCTION, UNSPECIFIED ERECTILE DYSFUNCTION TYPE: ICD-10-CM

## 2024-10-11 DIAGNOSIS — N40.1 BPH WITH OBSTRUCTION/LOWER URINARY TRACT SYMPTOMS: ICD-10-CM

## 2024-10-11 DIAGNOSIS — N39.0 RECURRENT UTI: ICD-10-CM

## 2024-10-11 DIAGNOSIS — N13.8 BPH WITH OBSTRUCTION/LOWER URINARY TRACT SYMPTOMS: ICD-10-CM

## 2024-10-11 DIAGNOSIS — N20.0 STAGHORN CALCULUS: ICD-10-CM

## 2024-10-11 DIAGNOSIS — R82.991 HYPOCITRATURIA: Primary | ICD-10-CM

## 2024-10-11 LAB
APPEARANCE: CLEAR
BILIRUBIN: NEGATIVE
GLUCOSE (URINE DIPSTICK): NEGATIVE MG/DL
KETONES (URINE DIPSTICK): NEGATIVE MG/DL
LEUKOCYTES: NEGATIVE
MULTISTIX LOT#: NORMAL NUMERIC
NITRITE, URINE: NEGATIVE
OCCULT BLOOD: NEGATIVE
PH, URINE: 6 (ref 4.5–8)
PROTEIN (URINE DIPSTICK): NEGATIVE MG/DL
SPECIFIC GRAVITY: 1.01 (ref 1–1.03)
URINE-COLOR: YELLOW
UROBILINOGEN,SEMI-QN: 0.2 MG/DL (ref 0–1.9)

## 2024-10-11 PROCEDURE — 81003 URINALYSIS AUTO W/O SCOPE: CPT | Performed by: UROLOGY

## 2024-10-11 PROCEDURE — G2211 COMPLEX E/M VISIT ADD ON: HCPCS | Performed by: UROLOGY

## 2024-10-11 PROCEDURE — 99214 OFFICE O/P EST MOD 30 MIN: CPT | Performed by: UROLOGY

## 2024-10-11 RX ORDER — TADALAFIL 20 MG/1
20 TABLET ORAL
Qty: 30 TABLET | Refills: 5 | Status: SHIPPED | OUTPATIENT
Start: 2024-10-11

## 2024-10-11 RX ORDER — FINASTERIDE 5 MG/1
5 TABLET, FILM COATED ORAL DAILY
Qty: 90 TABLET | Refills: 5 | Status: SHIPPED | OUTPATIENT
Start: 2024-10-11

## 2024-10-11 RX ORDER — TAMSULOSIN HYDROCHLORIDE 0.4 MG/1
0.4 CAPSULE ORAL DAILY
Qty: 90 CAPSULE | Refills: 5 | Status: SHIPPED | OUTPATIENT
Start: 2024-10-11

## 2024-10-11 RX ORDER — POTASSIUM CITRATE 10 MEQ/1
10 TABLET, EXTENDED RELEASE ORAL DAILY
Qty: 90 TABLET | Refills: 0 | Status: SHIPPED | OUTPATIENT
Start: 2024-10-11

## 2024-10-14 ENCOUNTER — PATIENT MESSAGE (OUTPATIENT)
Dept: FAMILY MEDICINE CLINIC | Facility: CLINIC | Age: 69
End: 2024-10-14

## 2024-10-14 DIAGNOSIS — M25.551 RIGHT HIP PAIN: Primary | ICD-10-CM

## 2024-10-14 NOTE — TELEPHONE ENCOUNTER
Do you want to see patient in one of your 15 minute appointments today?    Routed to Dr Broderick

## 2024-10-17 ENCOUNTER — HOSPITAL ENCOUNTER (OUTPATIENT)
Dept: GENERAL RADIOLOGY | Age: 69
Discharge: HOME | End: 2024-10-17
Attending: FAMILY MEDICINE
Payer: MEDICARE

## 2024-10-17 ENCOUNTER — OFFICE VISIT (OUTPATIENT)
Dept: INTERNAL MEDICINE CLINIC | Facility: CLINIC | Age: 69
End: 2024-10-17
Payer: MEDICARE

## 2024-10-17 VITALS
TEMPERATURE: 98 F | OXYGEN SATURATION: 99 % | DIASTOLIC BLOOD PRESSURE: 62 MMHG | WEIGHT: 178 LBS | BODY MASS INDEX: 24 KG/M2 | HEART RATE: 59 BPM | SYSTOLIC BLOOD PRESSURE: 118 MMHG

## 2024-10-17 DIAGNOSIS — M25.551 RIGHT HIP PAIN: Primary | ICD-10-CM

## 2024-10-17 DIAGNOSIS — M25.551 RIGHT HIP PAIN: ICD-10-CM

## 2024-10-17 PROCEDURE — 73502 X-RAY EXAM HIP UNI 2-3 VIEWS: CPT | Performed by: FAMILY MEDICINE

## 2024-10-17 PROCEDURE — 1159F MED LIST DOCD IN RCRD: CPT | Performed by: FAMILY MEDICINE

## 2024-10-17 PROCEDURE — 3078F DIAST BP <80 MM HG: CPT | Performed by: FAMILY MEDICINE

## 2024-10-17 PROCEDURE — 1160F RVW MEDS BY RX/DR IN RCRD: CPT | Performed by: FAMILY MEDICINE

## 2024-10-17 PROCEDURE — 99213 OFFICE O/P EST LOW 20 MIN: CPT | Performed by: FAMILY MEDICINE

## 2024-10-17 PROCEDURE — 3074F SYST BP LT 130 MM HG: CPT | Performed by: FAMILY MEDICINE

## 2024-10-17 NOTE — PROGRESS NOTES
Subjective:   Sridhar Melton is presenting for evaluation of R hip pain. The pain started about 5 weeks ago while golfing. Mainly during his swing. He does have some discomfort when walking on the treadmill for exercise as well. No radicular pain through the LE, no sig LBP.     ROS:  GENERAL: No fever, chills, feels well otherwise    Objective:  /62 (BP Location: Right arm, Patient Position: Sitting, Cuff Size: adult)   Pulse 59   Temp 98.3 °F (36.8 °C) (Temporal)   Wt 178 lb (80.7 kg)   SpO2 99%   BMI 24.14 kg/m²   Constitutional: Oriented to person, place, and time. No distress.   Musculoskeletal: Patient walks with a generally nonantalgic gait.  He has mild tenderness over the right greater trochanter.  He has some restriction in the right hip range of motion with internal rotation and he has reproducible pain with FADIR.  No significant pain with straight leg raise.  Strength and sensation intact in the lower extremity.    Imaging: No imaging to review.      Assessment/Plan:   Sridhar Melton is presenting for evaluation of activity dependent right hip pain that began approximately 5 weeks ago while golfing.  His exam demonstrates tenderness over his greater trochanter and greater trochanteric pain syndrome may be the cause of his symptoms.  In addition he does have reproducible pain with internal rotation and mildly limited range of motion raising suspicion for osteoarthritis.  Will further evaluate with weightbearing radiographs today.  We will plan further therapeutic options pending the results of these radiographs.      Sal Hoyt MD

## 2024-11-19 ENCOUNTER — TELEPHONE (OUTPATIENT)
Dept: FAMILY MEDICINE CLINIC | Facility: CLINIC | Age: 69
End: 2024-11-19

## 2024-11-19 DIAGNOSIS — Z12.11 ENCOUNTER FOR SCREENING COLONOSCOPY: Primary | ICD-10-CM

## 2024-11-19 NOTE — TELEPHONE ENCOUNTER
Andrade Walters MD [address] [place] [address] [phone]    called and talked to wife gave her the contact information for the referral.

## 2025-01-02 ENCOUNTER — TELEPHONE (OUTPATIENT)
Dept: FAMILY MEDICINE CLINIC | Facility: CLINIC | Age: 70
End: 2025-01-02

## 2025-01-02 DIAGNOSIS — Z12.83 SKIN EXAM, SCREENING FOR CANCER: ICD-10-CM

## 2025-01-02 DIAGNOSIS — L98.9 SKIN LESION: Primary | ICD-10-CM

## 2025-01-08 ENCOUNTER — OFFICE VISIT (OUTPATIENT)
Dept: FAMILY MEDICINE CLINIC | Facility: CLINIC | Age: 70
End: 2025-01-08
Payer: MEDICARE

## 2025-01-08 ENCOUNTER — LAB ENCOUNTER (OUTPATIENT)
Dept: LAB | Age: 70
End: 2025-01-08
Attending: FAMILY MEDICINE
Payer: MEDICARE

## 2025-01-08 VITALS
OXYGEN SATURATION: 97 % | BODY MASS INDEX: 23.3 KG/M2 | RESPIRATION RATE: 16 BRPM | DIASTOLIC BLOOD PRESSURE: 60 MMHG | SYSTOLIC BLOOD PRESSURE: 110 MMHG | HEIGHT: 72 IN | HEART RATE: 67 BPM | WEIGHT: 172 LBS

## 2025-01-08 DIAGNOSIS — N18.32 STAGE 3B CHRONIC KIDNEY DISEASE (HCC): ICD-10-CM

## 2025-01-08 DIAGNOSIS — R82.991 HYPOCITRATURIA: ICD-10-CM

## 2025-01-08 DIAGNOSIS — Z12.5 SCREENING FOR MALIGNANT NEOPLASM OF PROSTATE: ICD-10-CM

## 2025-01-08 DIAGNOSIS — Z13.6 SCREENING FOR CARDIOVASCULAR CONDITION: ICD-10-CM

## 2025-01-08 DIAGNOSIS — Z86.711 HISTORY OF PULMONARY EMBOLUS (PE): ICD-10-CM

## 2025-01-08 DIAGNOSIS — Z87.442 HISTORY OF URIC ACID STAGHORN CALCULUS: ICD-10-CM

## 2025-01-08 DIAGNOSIS — Z00.00 ENCOUNTER FOR ANNUAL HEALTH EXAMINATION: Primary | ICD-10-CM

## 2025-01-08 PROBLEM — N18.31 STAGE 3A CHRONIC KIDNEY DISEASE (HCC): Status: RESOLVED | Noted: 2023-01-03 | Resolved: 2025-01-08

## 2025-01-08 LAB
ALBUMIN SERPL-MCNC: 4.5 G/DL (ref 3.2–4.8)
ALBUMIN/GLOB SERPL: 1.6 {RATIO} (ref 1–2)
ALP LIVER SERPL-CCNC: 57 U/L
ALT SERPL-CCNC: <7 U/L
ANION GAP SERPL CALC-SCNC: 5 MMOL/L (ref 0–18)
AST SERPL-CCNC: 16 U/L (ref ?–34)
BILIRUB SERPL-MCNC: 0.7 MG/DL (ref 0.2–1.1)
BUN BLD-MCNC: 20 MG/DL (ref 9–23)
CALCIUM BLD-MCNC: 9.5 MG/DL (ref 8.7–10.4)
CHLORIDE SERPL-SCNC: 107 MMOL/L (ref 98–112)
CHOLEST SERPL-MCNC: 167 MG/DL (ref ?–200)
CO2 SERPL-SCNC: 29 MMOL/L (ref 21–32)
COMPLEXED PSA SERPL-MCNC: 1.26 NG/ML (ref ?–4)
CREAT BLD-MCNC: 1.36 MG/DL
EGFRCR SERPLBLD CKD-EPI 2021: 56 ML/MIN/1.73M2 (ref 60–?)
FASTING PATIENT LIPID ANSWER: YES
FASTING STATUS PATIENT QL REPORTED: YES
GLOBULIN PLAS-MCNC: 2.8 G/DL (ref 2–3.5)
GLUCOSE BLD-MCNC: 96 MG/DL (ref 70–99)
HDLC SERPL-MCNC: 72 MG/DL (ref 40–59)
LDLC SERPL CALC-MCNC: 86 MG/DL (ref ?–100)
NONHDLC SERPL-MCNC: 95 MG/DL (ref ?–130)
OSMOLALITY SERPL CALC.SUM OF ELEC: 294 MOSM/KG (ref 275–295)
POTASSIUM SERPL-SCNC: 4.6 MMOL/L (ref 3.5–5.1)
PROT SERPL-MCNC: 7.3 G/DL (ref 5.7–8.2)
SODIUM SERPL-SCNC: 141 MMOL/L (ref 136–145)
TRIGL SERPL-MCNC: 42 MG/DL (ref 30–149)
URATE SERPL-MCNC: 5.3 MG/DL
VLDLC SERPL CALC-MCNC: 7 MG/DL (ref 0–30)

## 2025-01-08 PROCEDURE — 36415 COLL VENOUS BLD VENIPUNCTURE: CPT

## 2025-01-08 PROCEDURE — 80061 LIPID PANEL: CPT

## 2025-01-08 PROCEDURE — 84550 ASSAY OF BLOOD/URIC ACID: CPT

## 2025-01-08 PROCEDURE — 80053 COMPREHEN METABOLIC PANEL: CPT

## 2025-01-08 RX ORDER — POTASSIUM CITRATE 10 MEQ/1
10 TABLET, EXTENDED RELEASE ORAL DAILY
Qty: 90 TABLET | Refills: 3 | Status: SHIPPED | OUTPATIENT
Start: 2025-01-08

## 2025-01-08 NOTE — PROGRESS NOTES
Subjective:   Sridhar Melton is a 69 year old male who presents for a MA AHA (Medicare Advantage Annual Health Assessment) and scheduled follow up of multiple significant but stable problems.   Preventative Screening  Colonoscopy - scheduled for March 2025.   Prostate - 2.29.    Immunizations - TDaP 2021.  PNA 23 in 2011.  Eligible for PNA and shingrix.     CKD - due for labs.  GFR 44 on last labs end 2023.  Urination better with the three meds he is on - K citrate, Flomax, and finasteride.       History/Other:   Fall Risk Assessment:   He has been screened for Falls and is low risk.      Cognitive Assessment:   He had a completely normal cognitive assessment - see flowsheet entries       Functional Ability/Status:   Sridhar Melton has some abnormal functions as listed below:  He has Hearing problems based on screening of functional status.      Depression Screening (PHQ):  PHQ-2 SCORE: 0  , done 1/8/2025             Advanced Directives:   He does NOT have a Living Will. [ ]  He does NOT have a Power of  for Health Care. [ ]  Discussed Advance Care Planning with patient (and family/surrogate if present). Standard forms made available to patient in After Visit Summary.      Patient Active Problem List   Diagnosis    History of uric acid staghorn calculus    History of pulmonary embolus (PE) 2003 s/p IVC filter    Stage 3b chronic kidney disease (HCC)     Allergies:  He has No Known Allergies.    Current Medications:  Outpatient Medications Marked as Taking for the 1/8/25 encounter (Office Visit) with Zi Broderick MD   Medication Sig    potassium citrate 10 MEQ (1080 MG) Oral Tab CR Take 1 tablet (10 mEq total) by mouth daily.    PEG 3350-KCl-Na Bicarb-NaCl 420 g Oral Recon Soln Take as directed by physician    finasteride 5 MG Oral Tab Take 1 tablet (5 mg total) by mouth daily.    Tadalafil (CIALIS) 20 MG Oral Tab Take 1 tablet (20 mg total) by mouth daily as needed for Erectile Dysfunction.     tamsulosin 0.4 MG Oral Cap Take 1 capsule (0.4 mg total) by mouth daily.       Medical History:  He  has a past medical history of Brain bleed ([facility]) (7-), Calculus of kidney, DVT (deep venous thrombosis) (HCC) (2003), Alvaro filter in place (2003), PE (pulmonary embolism) (2003), Presence of other cardiac implants and grafts, Pulmonary embolism (HCC), and Staghorn calculus.  Surgical History:  He  has a past surgical history that includes angiogram (2003); other surgical history (Left, 12/4/2014); and colonoscopy.   Family History:  His family history is not on file.  Social History:  He  reports that he has never smoked. He has never used smokeless tobacco. He reports current alcohol use of about 4.0 standard drinks of alcohol per week. He reports that he does not use drugs.    Tobacco:  He has never smoked tobacco.    CAGE Alcohol Screen:   CAGE screening score of 0 on 1/8/2025, showing low risk of alcohol abuse.      Patient Care Team:  Zi Broderick MD as PCP - General (Family Medicine)  Ayan Solares DO as PCP (Family Practice)  Femi Pearson MD (NEPHROLOGY)  Haroldo Diaz MD (Family Medicine)    Review of Systems  Constitutional: negative  Eyes: negative  Ears, nose, mouth, throat, and face: negative  Respiratory: negative  Cardiovascular: negative  Gastrointestinal: negative  Genitourinary: negative  Neurological: negative      Objective:   Physical Exam  General Appearance:  Alert, cooperative, no distress, appears stated age   Head:  Normocephalic, without obvious abnormality, atraumatic   Eyes:  PERRL, conjunctiva/corneas clear, EOM's intact   Neck: Supple, symmetrical, trachea midline, no adenopathy   Back:   Symmetric, no curvature, ROM normal, no CVA tenderness   Lungs:   Clear to auscultation bilaterally, respirations unlabored   Chest Wall:  No tenderness or deformity   Heart:  Regular rate and rhythm, S1, S2 normal, no murmur, rub or gallop   Abdomen:   Soft, non-tender, bowel  sounds active all four quadrants,  no masses, no organomegaly   Extremities: Extremities normal, atraumatic, no cyanosis or edema   Pulses: 2+ and symmetric   Skin: Skin color, texture, turgor normal, no rashes or lesions   Neurologic: Normal      /60   Pulse 67   Resp 16   Ht 6' (1.829 m)   Wt 172 lb (78 kg)   SpO2 97%   BMI 23.33 kg/m²  Estimated body mass index is 23.33 kg/m² as calculated from the following:    Height as of this encounter: 6' (1.829 m).    Weight as of this encounter: 172 lb (78 kg).    Medicare Hearing Assessment:   Hearing Screening    Time taken: 1/8/2025 10:57 AM  Screening Method: Whisper Test  Whisper Test Result: Fail (Comment: Wears hearing aids)         Visual Acuity:   Right Eye Visual Acuity: Uncorrected Right Eye Chart Acuity: 20/30   Left Eye Visual Acuity: Uncorrected Left Eye Chart Acuity: 20/40   Both Eyes Visual Acuity: Uncorrected Both Eyes Chart Acuity: 20/30   Able To Tolerate Visual Acuity: Yes        Assessment & Plan:     Sridhar Melton was seen in the office today:  had concerns including Health Maintenance (Reviewed Preventative/Wellness form with patient./).    1. Encounter for annual health examination  Overall well  Following healthy diet and exercise  Labs due.  Orders placed  Colon screening up-to-date  Routine screening PSA ordered  Immunizations discussed.  Eligible for pneumonia shot and Shingrix    2. History of uric acid staghorn calculus  3. Hypocitraturia  Has been on potassium citrate to help reduce the risk of recurrence staghorn calculus  Managed by myself and the urology team.  Urination has been good with the addition of the potassium citrate as well as Flomax and Proscar  - potassium citrate 10 MEQ (1080 MG) Oral Tab CR; Take 1 tablet (10 mEq total) by mouth daily.  Dispense: 90 tablet; Refill: 3  - Uric Acid; Future    4. Stage 3b chronic kidney disease (HCC)  Noted in the past labs  Due for repeat.  Orders placed  - Comp Metabolic  Panel (14); Future    5. Screening for malignant neoplasm of prostate  Routine prostate screening  - PSA Total, Screen; Future    6. Screening for cardiovascular condition  Routine lipid screening  - Lipid Panel; Future    7. History of pulmonary embolus (PE) 2003 s/p IVC filter  No active issues.  IVC filter remains in place with no complications.  Will continue to watch for swelling or other adverse effects            The patient indicates understanding of these issues and agrees to the plan.  Reinforced healthy diet, lifestyle, and exercise.      Return for Wellness Visit.     Zi Broderick MD, 1/8/2025     Supplementary Documentation:   General Health:  In the past six months, have you lost more than 10 pounds without trying?: 2 - No  Has your appetite been poor?: No  Type of Diet: Balanced  How does the patient maintain a good energy level?: Appropriate Exercise  How would you describe your daily physical activity?: Light  How would you describe your current health state?: Fair  How do you maintain positive mental well-being?: Social Interaction;Visiting Family;Visiting Friends  On a scale of 0 to 10, with 0 being no pain and 10 being severe pain, what is your pain level?: 0 - (None)  In the past six months, have you experienced urine leakage?: 0-No  At any time do you feel concerned for the safety/well-being of yourself and/or your children, in your home or elsewhere?: No  Have you had any immunizations at another office such as Influenza, Hepatitis B, Tetanus, or Pneumococcal?: No    Health Maintenance   Topic Date Due    Zoster Vaccines (1 of 2) Never done    Pneumococcal Vaccine: 50+ Years (2 of 2 - PCV) 12/27/2012    COVID-19 Vaccine (3 - 2024-25 season) 09/01/2024    Influenza Vaccine (1) 10/01/2024    Annual Well Visit  01/01/2025    Annual Depression Screening  01/01/2025    Fall Risk Screening (Annual)  01/01/2025    Colorectal Cancer Screening  01/20/2025    PSA  11/20/2025    Meningococcal B  Vaccine  Aged Out

## (undated) DEVICE — SYSTEM PUMPING SINGLE ACTION

## (undated) DEVICE — CATH FOLEY COUNCIL 18FR 5CC

## (undated) DEVICE — PROBE KIT TRIOLOGY 3.9X440

## (undated) DEVICE — MEDI-VAC NON-CONDUCTIVE SUCTION TUBING: Brand: CARDINAL HEALTH

## (undated) DEVICE — DUAL LUMEN URETERAL CATHETER

## (undated) DEVICE — Device

## (undated) DEVICE — NITINOL STONE RETRIEVAL BASKET: Brand: ZERO TIP

## (undated) DEVICE — SYRINGE 30ML LL TIP

## (undated) DEVICE — CLEAR RENAL SHEATH: Brand: CLEAR RENAL SHEATH

## (undated) DEVICE — SOLO HYBRID WIRE 35 FLEX STR

## (undated) DEVICE — ZIPWIRE GUIDEWIRE .038X150 STR

## (undated) DEVICE — SOL  .9 1000ML BTL

## (undated) DEVICE — PERCUTANEUOS NEPHROLOGY CDS: Brand: MEDLINE INDUSTRIES, INC.

## (undated) DEVICE — SEAL Y BIOPSY PORT P6R SCOPE

## (undated) DEVICE — KENDALL SCD EXPRESS SLEEVES, KNEE LENGTH, MEDIUM: Brand: KENDALL SCD

## (undated) DEVICE — SUTURE SILK 3-0 X-1

## (undated) DEVICE — PROXIS URETERAL ACCESS SHEATH

## (undated) DEVICE — PERC NCIRCLE, NITINOL TIPLESS STONE EXTRACTOR: Brand: PERC NCIRCLE

## (undated) DEVICE — ZIPWIRE GUIDE .035X150 STR/STF

## (undated) DEVICE — SOL  .9 3000ML

## (undated) DEVICE — GAUZE SPONGES,12 PLY: Brand: CURITY

## (undated) DEVICE — BAG DRAIN INFECTION CNTRL 2000

## (undated) DEVICE — GUIDEWIRE STIFF AMPL .035X145

## (undated) DEVICE — ABDOMINAL PAD: Brand: DERMACEA

## (undated) DEVICE — #11 STERILE BLADE: Brand: POLYMER COATED BLADES

## (undated) DEVICE — HIGH PRESSURE NEPHROSTOMY BALLOON CATHETER KIT: Brand: NEPHROMAX KIT

## (undated) DEVICE — FLEXIVA  PULSE  AND  FLEXIVA  PULSE  TRACTIP  LASER  FIBERS  ARE  HIGH  POWER  SINGLE-USE FIBER: Brand: FLEXIVA PULSE ID

## (undated) DEVICE — 3M(TM) MICROPORE TAPE DISPENSER 1535-2: Brand: 3M™ MICROPORE™

## (undated) NOTE — LETTER
Patient Name: Jhonatan Miller  : 1955  MRN: [id number]  Patient Address: [place] [address]      Coronavirus Disease 2019 (COVID-19)     [facility] is committed to the safety and well-being of our patients, members, employees 2. Monitor your symptoms carefully. If your symptoms get worse, call your healthcare provider immediately. 3. Get rest and stay hydrated.    4. If you have a medical appointment, call the healthcare provider ahead of time and tell them that you have or may ? At least 24 hours have passed since recovery defined as resolution of fever without the use of fever-reducing medications; and  · Improvement in respiratory symptoms (e.g., cough, shortness of breath); and  · At least 10 days have passed since symptoms f If you would be interested in donating your plasma to help treat others diagnosed with the virus, please contact [facility] directly on their website: [email or web address]    Who is eligible to donate convalescent plasma?

## (undated) NOTE — LETTER
[facility]  Testing Department  Phone: [phone]  OUTSIDE TESTING RESULT REQUEST      TO:   Dr Georgina Fitch Today's Date: 1/27/21    FAX #: [phone]     IMPORTANT: FOR YOUR IMMEDIATE ATTENTION  Please FAX all test results listed below to: [phone]

## (undated) NOTE — LETTER
[facility] Testing Department  Phone: [phone]  OUTSIDE TESTING RESULT REQUEST      TO:   *** Today's Date: 1/27/21    FAX #: ***     IMPORTANT: FOR YOUR IMMEDIATE ATTENTION  Please FAX all test results listed below to: [phone]     Tracy

## (undated) NOTE — LETTER
[facility] [address], [address]  Consent for Procedure/Sedation    Date:2/4/21   Time:1000      1. I authorize the performance upon Santana Smith the following:  Insertion Nephrostomy tube/ wire     2.  I authorize Dr Marcello Wan Printed: 2021   9:53 AM  Patient Name: Alexandria Cordero        : 1955       Medical Record #: [id number]